# Patient Record
Sex: FEMALE | URBAN - METROPOLITAN AREA
[De-identification: names, ages, dates, MRNs, and addresses within clinical notes are randomized per-mention and may not be internally consistent; named-entity substitution may affect disease eponyms.]

---

## 2024-06-21 ENCOUNTER — OFFICE VISIT (OUTPATIENT)
Dept: PHYSICAL MEDICINE AND REHAB | Facility: CLINIC | Age: 29
End: 2024-06-21

## 2024-06-21 DIAGNOSIS — M75.02 ADHESIVE CAPSULITIS OF LEFT SHOULDER: Primary | ICD-10-CM

## 2024-06-21 PROCEDURE — 99204 OFFICE O/P NEW MOD 45 MIN: CPT | Performed by: PHYSICAL MEDICINE & REHABILITATION

## 2024-06-21 RX ORDER — MELOXICAM 15 MG/1
15 TABLET ORAL DAILY
Qty: 14 TABLET | Refills: 0 | Status: SHIPPED | OUTPATIENT
Start: 2024-06-21

## 2024-06-21 NOTE — H&P
History and Physical    C/C:   Chief Complaint   Patient presents with    New Patient     New patient is here with complaints of left shoulder pain. States the pain started 6/7 months ago , no know injury occurring. Reports pain to be a constant aching pain. Not taking any pain meds or muscle relaxer's. No physical therapy. Pian depends on activity.     HPI: 28 year old left handed female presents with chronic left shoulder pain without inciting event.  Has been present for about 6 to 7 months without an inciting event.  She has no previous history of left shoulder disorders.  Overhead lifting mildly painful. Range is a little limited. Able to do all activity despite slight lack of ROM. Worsens thoughout the day, and can worsen at night time. Does some exercise at home, which does not overly provoke left shoulder pain.  Some pain with upper body dressing.    Allergies: NKDA    Patient-reported ROS  Constitutional  Sleep Disturbance: denies  Chills: denies  Fever: denies  Weight Gain: denies  Weight Loss: denies   Cardiovascular  Chest Pain: denies  Irregular Heartbeat: denies   Respiratory  Painful Breathing: denies  Wheezing: denies   Gastrointestinal  Bowel Incontinence: denies  Heartburn: denies  Abdominal Pain: denies  Blood in Stool : denies  Rectal Pain: denies   Hematology  Easy Bruising: denies  Easy Bleeding: denies   Genitourinary  Difficulty Urinating: denies  Bladder Incontinence: denies  Pelvic Pain: denies  Painful Urination: denies   Musculoskeletal  Joint Stiffness: denies  Painful Joints: denies  Tailbone Pain: denies  Swollen Joints: denies   Peripheral Vascular  Swelling of Legs/Feet: denies  Cold Extremities: denies   Skin  Open Sores: denies  Nodules or Lumps: denies  Rash: denies   Neurological  Loss of Strength Since last Visit: denies  Tingling/Numbness: denies  Balance: denies   Psychiatric  Anxiety: denies  Depressed Mood: denies      Physical exam:    PE left shoulder exam:    Range of  motion:  Forward flexion: 160 degrees  Abduction: 180 degrees  Internal rotation: T7  External rotation: minimal to no restriction with PROM    Provocative test:  Supraspinatus test: negative  Hawkin's test: negative  Neer's test: +  AC joint tenderness: negative  Cross arm adduction test: negative    Imaging: No imaging to review    Assessment and plan:  Adhesive capsulitis of left shoulder    Though she is not the typical patient to develop adhesive capsulitis she does appear to have developed it to a mild degree. This may get better within the next few months.  We discussed her treatment options, which includes a trial of meloxicam 15 mg daily for the next 2 weeks, physical therapy to improve the range of motion and for scapular stabilization, and if not making further improvement and if significantly painful left subacromial bursa or glenohumeral joint steroid injection under ultrasound guidance.  Her range of motion is limited in forward flexion, but not overly limited in any other planes; injection may not be needed. She will start with meloxicam + PT. If she has not seen any improvement whatsoever after 3 weeks of physical therapy, or if shoulder pain worsens consider an x-ray and MRI of the left shoulder.    Julio Mckeon DO  Physical Medicine and Rehabilitation  [facility]

## 2024-06-21 NOTE — PATIENT INSTRUCTIONS
If not seeing any improvement after 3 weeks of physical therapy please let me know and I will order Xray, and possibly an MRI of your left shoulder.

## 2024-07-09 ENCOUNTER — TELEPHONE (OUTPATIENT)
Dept: PHYSICAL THERAPY | Facility: HOSPITAL | Age: 29
End: 2024-07-09

## 2024-07-11 ENCOUNTER — OFFICE VISIT (OUTPATIENT)
Dept: PHYSICAL THERAPY | Age: 29
End: 2024-07-11
Attending: PHYSICAL MEDICINE & REHABILITATION
Payer: COMMERCIAL

## 2024-07-11 DIAGNOSIS — M75.02 ADHESIVE CAPSULITIS OF LEFT SHOULDER: Primary | ICD-10-CM

## 2024-07-11 PROCEDURE — 97161 PT EVAL LOW COMPLEX 20 MIN: CPT | Performed by: PHYSICAL THERAPIST

## 2024-07-11 PROCEDURE — 97112 NEUROMUSCULAR REEDUCATION: CPT | Performed by: PHYSICAL THERAPIST

## 2024-07-11 PROCEDURE — 97110 THERAPEUTIC EXERCISES: CPT | Performed by: PHYSICAL THERAPIST

## 2024-07-11 NOTE — PROGRESS NOTES
SHOULDER EVALUATION:     Diagnosis:   Adhesive capsulitis of left shoulder (M75.02)       Referring Provider: Julio Mckeon  Date of Evaluation:    7/11/2024    Precautions:  None Next MD visit:   none scheduled  Date of Surgery: n/a     PATIENT SUMMARY   Sarina Barnard is a 28 year old female who presents to therapy today with complaints of (L) anterior and lateral (L) shoulder pain for no apparent cause since about 8 months ago.  Pt describes pain level current 0/10, at best 0/10, at worst 6/10. Symptoms aggravated by reaching and lifting especially overhead and behind the back.  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     Sarina describes prior level of function being (I) and active; lives with  and shares homemaking chores and describes cooking using cast iron. Pt goals include pain relief.  She works FT as a  with light PDL.  Past medical history was reviewed with Sarina and determined non-contributory.    ASSESSMENT  Sarina presents to physical therapy evaluation with primary c/o (L) shoulder pain (dominant arm) especially with reaching and lifting overhead. The results of the objective tests and measures show symptoms and findings consistent with subacromial impingement resulting in increased guarding and capsular tightness and aggravated by postures promoting scapular protraction and anterior tilt.  Functional deficits include but are not limited to painful reaching overhead and behind the back.  Signs and symptoms are consistent with diagnosis as stated above. Pt and PT discussed evaluation findings, pathology, POC and HEP.  Pt voiced understanding and performs HEP correctly without reported pain. Skilled Physical Therapy is medically necessary to address the above impairments and reach functional goals.     OBJECTIVE:   Observation/Posture: forward head and rounded shoulders; dowager's  hump noted  Palpation: tenderness to anterior (L) shoulder and bicipital groove  Sensation: grossly intact  Cervical Screen: negative    AROM: (* denotes performed with pain)  Shoulder    Flexion: R 180; L 160  Abduction: R 180; L 160  ER: R 110; L 90  IR: R 70; L 60     Accessory motion: mod loss of (L) GH posterior and inferior glide; mod loss of (L) scapulothoracic mobility towards posterior tilt, adduction and upward rotation.    Flexibility: mod loss to (L) pectorals    Strength/MMT: (* denotes performed with pain)  Shoulder Scapular   Flexion: R 5/5; L 4/5  Abduction: R 5/5; L 4/5  ER: R 5/5; L 4/5  IR: R 5/5; L 4/5 Rhomboids: R4-/5, L 4-/5  Mid trap: R 4-/5; L 4-/5   Lats: R 4-/5, L 4-/5  Low trap: R 4-/5; L 4-/5     Special tests:   (+) cross-over impingement, Neer's, Hawkin's-Gerardo    Today’s Treatment and Response:   Pt education was provided on exam findings, treatment diagnosis, treatment plan, expectations, and prognosis. Pt was also provided recommendations for activity modifications, possible soreness after evaluation, modalities as needed [ice/heat], postural corrections, ergonomics, and pain science education .    NEUROMUSCULAR RE-EDUCATION (10 mins)  Posture education and influence of postural influences on symptom irritability.  Wall posture drill  Work ergonomics with use of laptop    THERAPEUTIC EXERCISE (15 mins)  Cervical retractions  Scapular retractions  Scifit UE only x 3 mins ea fwd/retro focusing on upright posture  Doorway pec stretch A and 90/90 3 x 1 min ea  Patient was instructed in and issued a HEP for: Refer to patient instructions.    Charges: PT Eval Low Complexity, 20 mins      Total Timed Treatment: 25 min     Total Treatment Time: 45 min     Based on clinical rationale and outcome measures, this evaluation involved Low Complexity decision making due to 1-2 personal factors/comorbidities, 4+ body structures involved/activity limitations, and evolving symptoms including  changing pain levels.  PLAN OF CARE:    Goals: (to be met in 8 visits)   Sarina will demonstrate improved posture to promote proper scapular positioning and reduce tissue irritability.  Sarina will restore WNL and painfree (L) shoulder AROM to allow painfree reaching including overhead.  Sarina will have improved scapular and RC strength to 4+ to 5/5 to allow painfree lifting including overhead.  Sarina will be (I) with a home program to allow discharge from PT towards further self-recovery and maintenance of function.    Frequency / Duration: Patient will be seen for 1-2 x/week or a total of 8 visits over a 90 day period. Treatment will include: Manual Therapy, Neuromuscular Re-education, Self-Care Home Management, Therapeutic Activities, Therapeutic Exercise, and Home Exercise Program instruction    Education or treatment limitation: None  Rehab Potential:good    QuickDASH Outcome Score  Score: 11.36 % (7/8/2024  6:28 PM)      Sarina was advised of these findings, precautions, and treatment options and has agreed to actively participate in planning and for this course of care.    Thank you for your referral. Please co-sign or sign and return this letter via fax as soon as possible to [phone]. If you have any questions, please contact me at Dept: [phone]    Sincerely,  Electronically signed by therapist: Christiano Hightower, PT  Physician's certification required: Yes  I certify the need for these services furnished under this plan of treatment and while under my care.    X___________________________________________________ Date____________________    Certification From: 7/11/2024  To:10/9/2024

## 2024-07-15 ENCOUNTER — OFFICE VISIT (OUTPATIENT)
Dept: PHYSICAL THERAPY | Age: 29
End: 2024-07-15
Attending: PHYSICAL MEDICINE & REHABILITATION
Payer: COMMERCIAL

## 2024-07-15 PROCEDURE — 97112 NEUROMUSCULAR REEDUCATION: CPT | Performed by: PHYSICAL THERAPIST

## 2024-07-15 PROCEDURE — 97110 THERAPEUTIC EXERCISES: CPT | Performed by: PHYSICAL THERAPIST

## 2024-07-15 PROCEDURE — 97140 MANUAL THERAPY 1/> REGIONS: CPT | Performed by: PHYSICAL THERAPIST

## 2024-07-15 NOTE — PROGRESS NOTES
Dx: Adhesive capsulitis of left shoulder (M75.02)           Insurance (Authorized # of Visits):  8           Authorizing Physician: Dr. Linda Solitario MD visit: none scheduled  Fall Risk: standard         Precautions: n/a           Medication changes per patient? NO  Date of evaluation/PN:2024  Pain ratin at rest  Subjective: No problems reported with initial HEP and noted with post exercise soreness at the end of today's session.    Objective: Decreased tightness of pectoral mm noted and now able to achieve full (L) shoulder PROM; difficulty maintaining proximal stability in [place] with min perturbations.      Assessment: Decreased tightness of anterior structures allowing improved ROM but needs work on proximal strength/stability.  Treatment today include Manual Therapy and Therapeutic Exercise focusing on joint mobility and proximal stability and noted improvement in reduction of tightness in anterior structures post session. Residual deficits continue to be noted in decreased (L)UE proximal strength/stability and will be addressed with continued skilled interventions.      Goals: In progress as follows:  Sarnia will demonstrate improved posture to promote proper scapular positioning and reduce tissue irritability.  Sarina will restore WNL and painfree (L) shoulder AROM to allow painfree reaching including overhead.  Sarina will have improved scapular and RC strength to 4+ to 5/5 to allow painfree lifting including overhead.  Sarina will be (I) with a home program to allow discharge from PT towards further self-recovery and maintenance of function.    Plan: Monitor duration of post ex soreness and update HEP as appropriate next session.   Date: 7/15/2024  TX#:  Date:                 TX#: 3/ Date:                 TX#: 4/ Date:                 TX#: 5/ Date:   Tx#: 6/ Date:   Tx#: 7/ Date:   Tx#: 8/ Date:   Tx#: 9/ Date:   Tx#: 10/ Date:   Tx#: 11/ Date:   Tx#: 12/ Date:   Tx#: 13/ Date:   Tx#:  14/ Date:   Tx#: 15/ Date:   Tx#: 16/   THERAPEUTIC EX 30 mins                 Scifit UE only  L1 x 4 mins ea fwd/retro focus on upright posture                 (L) shoulder PROM 15 mins                 Lat pull downs Red cord 2x10                 Low rows with ER Red cord 2x10                                                                                         NEUROMUSCULAR RE-EDUCATION 8 mins                 Shoulder alphabet Standing at 90 flexion/scaption fist against yoga ball upper and lower case                 Rhythmic stabilization Supine at 90 and 110 flexion 2 x 30 secs ea                                   MANUAL TX 8 mins                 Joint mobilization Thoracic PA's gr 3-4; (L) scapulothoracic all planes; (L) GH post/inf gr 3-4 x 5 mins                 STM  (L) pectorals x 3 mins                                   THERAPEUTIC ACTIVITY                                    HEP: Continue initial HEP    Charges: Therapeutic exercise x 2; Neuromuscular re-education x 1; Manual therapy x 1       Total Timed Treatment: 46 min  Total Treatment Time: 46 min

## 2024-07-17 ENCOUNTER — OFFICE VISIT (OUTPATIENT)
Dept: PHYSICAL THERAPY | Age: 29
End: 2024-07-17
Attending: PHYSICAL MEDICINE & REHABILITATION
Payer: COMMERCIAL

## 2024-07-17 PROCEDURE — 97112 NEUROMUSCULAR REEDUCATION: CPT | Performed by: PHYSICAL THERAPIST

## 2024-07-17 PROCEDURE — 97110 THERAPEUTIC EXERCISES: CPT | Performed by: PHYSICAL THERAPIST

## 2024-07-17 NOTE — PROGRESS NOTES
Dx: Adhesive capsulitis of left shoulder (M75.02)           Insurance (Authorized # of Visits):  8           Authorizing Physician: Dr. Linda Solitario MD visit: none scheduled  Fall Risk: standard         Precautions: n/a           Medication changes per patient? NO  Date of evaluation/PN:2024  Pain ratin/10  Subjective: Reports feeling sore for the rest of the day after her previous session. Arrived with 2/10 rest pain.    Objective: Max cueing required to avoid genu recurvatum and maintain core engagement with performance of UE tasks. Held off on HEP progression with latent increased tissue irritability post-session.      Assessment: Needs work on core and proximal strengthening with proximal instability likely resulting in increased tissue irritability with increased available AROM.  Treatment today include Neuromuscular re-education and Therapeutic exercise focusing on increased core strength and proximal stability but noted with residual deficits in decreased (L)UE proximal strength/stability and will be addressed with continued skilled interventions.      Goals: In progress as follows:  Sarina will demonstrate improved posture to promote proper scapular positioning and reduce tissue irritability.  Sarina will restore WNL and painfree (L) shoulder AROM to allow painfree reaching including overhead.  Sarina will have improved scapular and RC strength to 4+ to 5/5 to allow painfree lifting including overhead.  Sarina will be (I) with a home program to allow discharge from PT towards further self-recovery and maintenance of function.    Plan: Continue to monitor post session soreness and adjust ex's as tolerated.   Date: 7/15/2024  TX#: 2 Date: 2024              TX#: 3/8 Date:                 TX#: 4/ Date:                 TX#: 5/ Date:   Tx#: 6/ Date:   Tx#: 7/ Date:   Tx#: 8/ Date:   Tx#: / Date:   Tx#: 10/ Date:   Tx#: 11/ Date:   Tx#: 12/ Date:   Tx#: 13/ Date:   Tx#: 14/ Date:    Tx#: 15/ Date:   Tx#: 16/   THERAPEUTIC EX 30 mins 38 mins                Scifit UE only  L1 x 4 mins ea fwd/retro focus on upright posture L1 x 4 mins ea fwd/retro focusing on upright posture                (L) shoulder PROM 15 mins                 Lat pull downs Red cord 2x10 Yellow cord 2x10                Low rows with ER Red cord 2x10 Yellow cord 2x10                SB push up plus  RSB 2x10                Face pulls  Yellow cord 2x10                High rows  Yellow cord 2x10                (L) shoulder D1/D2  Supine 2x15 ea                SL shoulder ER  (L) 2lbs 3x10                                  NEUROMUSCULAR RE-EDUCATION 8 mins 8 mins                Shoulder alphabet Standing at 90 flexion/scaption fist against yoga ball upper and lower case Standing at 90 flexion/scaption fist against yoga ball upper and lower case                Rhythmic stabilization Supine at 90 and 110 flexion 2 x 30 secs ea Supine at 90 and 110 flexion 2 x 60 secs ea                                  MANUAL TX 8 mins                 Joint mobilization Thoracic PA's gr 3-4; (L) scapulothoracic all planes; (L) GH post/inf gr 3-4 x 5 mins                 STM  (L) pectorals x 3 mins                                   THERAPEUTIC ACTIVITY                                    HEP: Continue initial HEP and monitor for continued soreness.    Charges: Therapeutic exercise x 3; Neuromuscular re-education x 1      Total Timed Treatment: 46 min  Total Treatment Time: 46 min

## 2024-07-23 ENCOUNTER — OFFICE VISIT (OUTPATIENT)
Dept: PHYSICAL THERAPY | Age: 29
End: 2024-07-23
Attending: PHYSICAL MEDICINE & REHABILITATION
Payer: COMMERCIAL

## 2024-07-23 PROCEDURE — 97110 THERAPEUTIC EXERCISES: CPT | Performed by: PHYSICAL THERAPIST

## 2024-07-23 PROCEDURE — 97112 NEUROMUSCULAR REEDUCATION: CPT | Performed by: PHYSICAL THERAPIST

## 2024-07-23 NOTE — PROGRESS NOTES
Dx: Adhesive capsulitis of left shoulder (M75.02)           Insurance (Authorized # of Visits):  8           Authorizing Physician: Dr. Linda Solitario MD visit: none scheduled  Fall Risk: standard         Precautions: n/a           Medication changes per patient? NO  Date of evaluation/PN:2024  Pain ratin/10  Subjective: Reports symptoms recurred yesterday and has remained worsened for no apparent cause.    Objective: Noted with increased trunk and LE movement from resisted UE movement on scifit; poor TA recruitment in unloaded neutral spine position      Assessment: Poor core/proximal stability promoting recurrence of increased tissue irritability with normal function.  Treatment today include Neuromuscular re-education and Therapeutic exercise focusing on increased core strength and proximal stability but noted with residual deficits in decreased core strength/stability and (L)UE proximal strength/stability and will be addressed with continued skilled interventions.      Goals: In progress as follows:  Sarina will demonstrate improved posture to promote proper scapular positioning and reduce tissue irritability.  Sarina will restore WNL and painfree (L) shoulder AROM to allow painfree reaching including overhead.  Sarina will have improved scapular and RC strength to 4+ to 5/5 to allow painfree lifting including overhead.  Sarina will be (I) with a home program to allow discharge from PT towards further self-recovery and maintenance of function.    Plan: Check response to updated HEP.   Date: 7/15/2024  TX#: 2 Date: 2024              TX#: 3/8 Date: 2024              TX#:  Date:                 TX#: 5/ Date:   Tx#: 6/ Date:   Tx#: 7/ Date:   Tx#: 8/ Date:   Tx#: / Date:   Tx#: 10/ Date:   Tx#: 11/ Date:   Tx#: 12/ Date:   Tx#: 13/ Date:   Tx#: 14/ Date:   Tx#: 15/ Date:   Tx#: 16/   THERAPEUTIC EX 30 mins 38 mins 40 mins               Scifit UE only  L1 x 4 mins ea fwd/retro  focus on upright posture L1 x 4 mins ea fwd/retro focusing on upright posture L3 x 4 mins ea fwd/retro focusing on upright posture               (L) shoulder PROM 15 mins                 Lat pull downs Red cord 2x10 Yellow cord 2x10                Low rows with ER Red cord 2x10 Yellow cord 2x10 Red cord 3x10               SB push up plus  RSB 2x10                Face pulls  Yellow cord 2x10 Red cord 3x10               High rows  Yellow cord 2x10                (L) shoulder D1/D2  Supine 2x15 ea                SL shoulder ER  (L) 2lbs 3x10 (L) 2lbs 3x10               (B) shoulder flexion   1lb dowel 3x10 supine               (B) shoulder ER in 90 abd   1lb dowel 3x10 supine               HL TA ball press   3x10               Prone heel squeeze   Yoga ball with knee flexion 3x10               Standing multifidi torsion   Standing on airex x 1 min                                 NEUROMUSCULAR RE-EDUCATION 8 mins 8 mins 8 mins               Shoulder alphabet Standing at 90 flexion/scaption fist against yoga ball upper and lower case Standing at 90 flexion/scaption fist against yoga ball upper and lower case                Rhythmic stabilization Supine at 90 and 110 flexion 2 x 30 secs ea Supine at 90 and 110 flexion 2 x 60 secs ea Standing on airex with fist on yoga ball on wall and 90 flexion and scaption 2 x 1 min ea               HL multifidi isometrics   Leaky tire cheat with self-biofeedback x 4 mins                                 MANUAL TX 8 mins                 Joint mobilization Thoracic PA's gr 3-4; (L) scapulothoracic all planes; (L) GH post/inf gr 3-4 x 5 mins                 STM  (L) pectorals x 3 mins                                   THERAPEUTIC ACTIVITY                                    HEP: Refer to patient instructions.    Charges: Therapeutic exercise x 3; Neuromuscular re-education x 1      Total Timed Treatment: 48 min  Total Treatment Time: 48 min

## 2024-07-24 ENCOUNTER — OFFICE VISIT (OUTPATIENT)
Dept: PHYSICAL THERAPY | Age: 29
End: 2024-07-24
Attending: PHYSICAL MEDICINE & REHABILITATION
Payer: COMMERCIAL

## 2024-07-24 PROCEDURE — 97112 NEUROMUSCULAR REEDUCATION: CPT | Performed by: PHYSICAL THERAPIST

## 2024-07-24 PROCEDURE — 97110 THERAPEUTIC EXERCISES: CPT | Performed by: PHYSICAL THERAPIST

## 2024-07-25 ENCOUNTER — APPOINTMENT (OUTPATIENT)
Dept: PHYSICAL THERAPY | Age: 29
End: 2024-07-25
Attending: PHYSICAL MEDICINE & REHABILITATION
Payer: COMMERCIAL

## 2024-07-29 ENCOUNTER — OFFICE VISIT (OUTPATIENT)
Dept: PHYSICAL THERAPY | Age: 29
End: 2024-07-29
Attending: PHYSICAL MEDICINE & REHABILITATION
Payer: COMMERCIAL

## 2024-07-29 PROCEDURE — 97112 NEUROMUSCULAR REEDUCATION: CPT | Performed by: PHYSICAL THERAPIST

## 2024-07-29 PROCEDURE — 97110 THERAPEUTIC EXERCISES: CPT | Performed by: PHYSICAL THERAPIST

## 2024-07-29 NOTE — PROGRESS NOTES
Dx: Adhesive capsulitis of left shoulder (M75.02)           Insurance (Authorized # of Visits):  8           Authorizing Physician: Dr. Linda Solitario MD visit: none scheduled  Fall Risk: standard         Precautions: n/a           Medication changes per patient? NO  Date of evaluation/PN:2024  Pain ratin/10  Subjective: Arrived with minimal pain and only reports m soreness post session.    Objective: Improving consistency with maintaining core engagement with (L) shoulder rhythmic stabilization ex's.      Assessment: Improving core engagement allowing reduced symptoms with upper quadrant movements and decreased tissue irritability in (L) shoulder.  Treatment today include Neuromuscular re-education and Therapeutic exercise focusing on increased core strength and proximal stability but noted with residual deficits in decreased core strength/stability and (L)UE proximal strength/stability and will be addressed with continued skilled interventions.      Goals: In progress as follows:  Sarina will demonstrate improved posture to promote proper scapular positioning and reduce tissue irritability.  Sarina will restore WNL and painfree (L) shoulder AROM to allow painfree reaching including overhead.  Sarina will have improved scapular and RC strength to 4+ to 5/5 to allow painfree lifting including overhead.  Sarina will be (I) with a home program to allow discharge from PT towards further self-recovery and maintenance of function.    Plan: continue to progress (L) scapular strengthening while focusing on core stability.   Date: 7/15/2024  TX#: 2 Date: 2024              TX#: 3/8 Date: 2024              TX#:  Date: 2024             TX#:  Date: 2024  Tx#: 6 Date:   Tx#: 7/ Date:   Tx#: 8/ Date:   Tx#: 9/ Date:   Tx#: 10/ Date:   Tx#: 11/ Date:   Tx#: 12/ Date:   Tx#: 13/ Date:   Tx#: 14/ Date:   Tx#: 15/ Date:   Tx#: 16/   THERAPEUTIC EX 30 mins 38 mins 40 mins 40 mins 40  mins             Scifit UE only  L1 x 4 mins ea fwd/retro focus on upright posture L1 x 4 mins ea fwd/retro focusing on upright posture L3 x 4 mins ea fwd/retro focusing on upright posture  L3 x 4 mins ea fwd/retro focusing on upright posture             (L) shoulder PROM 15 mins                 Lat pull downs Red cord 2x10 Yellow cord 2x10   Multi pull standing on airex beam 15lbs 3x10             Low rows with ER Red cord 2x10 Yellow cord 2x10 Red cord 3x10  Red cord 3x10             SB push up plus  RSB 2x10                Face pulls  Yellow cord 2x10 Red cord 3x10  Red cord 3x10             High rows  Yellow cord 2x10   Red cord 3x10             (L) shoulder D1/D2  Supine 2x15 ea                SL shoulder ER  (L) 2lbs 3x10 (L) 2lbs 3x10               (B) shoulder flexion   1lb dowel 3x10 supine               (B) shoulder ER in 90 abd   1lb dowel 3x10 supine               HL TA ball press   3x10               Prone heel squeeze   Yoga ball with knee flexion 3x10               Standing multifidi torsion   Standing on airex x 1 min               Prone V, W, horizontal abd    2 x 10 ea              HL isometric hip add with LE lifts    Yoga ball 3x10; last set with pilates 100s              HL isometric hip abd with LE lifts    Ring with yoga ball b/w ankles 3x10; last set with pilates 100s              HL bent knee fall outs    Green tband 3x10 ea              SL clamshells    Green tband 3x10 ea              HL TA ring press    Yoga ball b/w knees 3x10              TM retrowalk    12% grade 0.5 to 1mph x 10 mins with mirror biofeedback              90/90 wall taps     1lb ball 2 x 30 secs ea             Wall clock     4 to 2 o'clock x 10 rds                               NEUROMUSCULAR RE-EDUCATION 8 mins 8 mins 8 mins 8 mins 8 mins             Shoulder alphabet Standing at 90 flexion/scaption fist against yoga ball upper and lower case Standing at 90 flexion/scaption fist against yoga ball upper and lower case    Standing on airex at 90 flexion and scaption fist on yoga ball upper/lower case             Rhythmic stabilization Supine at 90 and 110 flexion 2 x 30 secs ea Supine at 90 and 110 flexion 2 x 60 secs ea Standing on airex with fist on yoga ball on wall and 90 flexion and scaption 2 x 1 min ea Standing on airex with fist on yoga ball on wall and 90 flexion and scaption 2 x 1 min ea Standing on airex with fist on yoga ball on wall and 90 flexion and scaption 2 x 1 min ea             HL multifidi isometrics   Leaky tire cheat with self-biofeedback x 4 mins               Standing with forehead on ball on wall    V, W, horizontal abd 2x10 with 3 sec hold ea                                MANUAL TX 8 mins                 Joint mobilization Thoracic PA's gr 3-4; (L) scapulothoracic all planes; (L) GH post/inf gr 3-4 x 5 mins                 STM  (L) pectorals x 3 mins                                   THERAPEUTIC ACTIVITY                                    HEP: Continue current HEP.    Charges: Therapeutic exercise x 3; Neuromuscular re-education x 1      Total Timed Treatment: 48 min  Total Treatment Time: 48 min

## 2024-07-31 ENCOUNTER — APPOINTMENT (OUTPATIENT)
Dept: PHYSICAL THERAPY | Age: 29
End: 2024-07-31
Attending: PHYSICAL MEDICINE & REHABILITATION
Payer: COMMERCIAL

## 2024-08-01 ENCOUNTER — OFFICE VISIT (OUTPATIENT)
Dept: PHYSICAL THERAPY | Age: 29
End: 2024-08-01
Attending: PHYSICAL MEDICINE & REHABILITATION
Payer: COMMERCIAL

## 2024-08-01 PROCEDURE — 97110 THERAPEUTIC EXERCISES: CPT | Performed by: PHYSICAL THERAPIST

## 2024-08-01 PROCEDURE — 97112 NEUROMUSCULAR REEDUCATION: CPT | Performed by: PHYSICAL THERAPIST

## 2024-08-01 NOTE — PROGRESS NOTES
Dx: Adhesive capsulitis of left shoulder (M75.02)           Insurance (Authorized # of Visits):  8           Authorizing Physician: Dr. Linda Solitario MD visit: none scheduled  Fall Risk: standard         Precautions: n/a           Medication changes per patient? NO  Date of evaluation/PN:2024  Pain ratin/10  Subjective: Reports her shoulder is feeling better.    Objective: Demonstrating consistent core engagement with performance of upper quadrant stability ex's      Assessment: Improving core strength allowing decreased symptoms and tolerance to progression of OH loads.  Treatment today include Neuromuscular re-education and Therapeutic exercise focusing on increased core strength and proximal stability but noted with residual deficits in decreased core strength/stability and (L)UE proximal strength/stability and will be addressed with continued skilled interventions.      Goals: In progress as follows:  Sarina will demonstrate improved posture to promote proper scapular positioning and reduce tissue irritability.  Sarina will restore WNL and painfree (L) shoulder AROM to allow painfree reaching including overhead.  Sarina will have improved scapular and RC strength to 4+ to 5/5 to allow painfree lifting including overhead.  Sarina will be (I) with a home program to allow discharge from PT towards further self-recovery and maintenance of function.    Plan: continue to progress (L) scapular strengthening while focusing on core stability.   Date: 7/15/2024  TX#:  Date: 2024              TX#: 3/8 Date: 2024              TX#:  Date: 2024             TX#:  Date: 2024  Tx#:  Date:2024  Tx#:  Date:   Tx#: / Date:   Tx#: 9/ Date:   Tx#: 10/ Date:   Tx#: 11/ Date:   Tx#: 12/ Date:   Tx#: 13/ Date:   Tx#: 14/ Date:   Tx#: 15/ Date:   Tx#: 16/   THERAPEUTIC EX 30 mins 38 mins 40 mins 40 mins 40 mins 40 mins            Scifit UE only  L1 x 4 mins ea fwd/retro focus  on upright posture L1 x 4 mins ea fwd/retro focusing on upright posture L3 x 4 mins ea fwd/retro focusing on upright posture  L3 x 4 mins ea fwd/retro focusing on upright posture L3 x 4 mins ea fwd/retro focusing on upright posture            (L) shoulder PROM 15 mins                 Lat pull downs Red cord 2x10 Yellow cord 2x10   Multi pull standing on airex beam 15lbs 3x10             Low rows with ER Red cord 2x10 Yellow cord 2x10 Red cord 3x10  Red cord 3x10 Green cord 3x10            SB push up plus  RSB 2x10                Face pulls  Yellow cord 2x10 Red cord 3x10  Red cord 3x10 Green cord 3x10            High rows  Yellow cord 2x10   Red cord 3x10 Green cord 3x10            (L) shoulder D1/D2  Supine 2x15 ea                SL shoulder ER  (L) 2lbs 3x10 (L) 2lbs 3x10               (B) shoulder flexion   1lb dowel 3x10 supine               (B) shoulder ER in 90 abd   1lb dowel 3x10 supine               HL TA ball press   3x10   RSB with LE lifts            Prone heel squeeze   Yoga ball with knee flexion 3x10               Standing multifidi torsion   Standing on airex x 1 min               Prone V, W, horizontal abd    2 x 10 ea              HL isometric hip add with LE lifts    Yoga ball 3x10; last set with pilates 100s              HL isometric hip abd with LE lifts    Ring with yoga ball b/w ankles 3x10; last set with pilates 100s              HL bent knee fall outs    Green tband 3x10 ea              SL clamshells    Green tband 3x10 ea              HL TA ring press    Yoga ball b/w knees 3x10  Alt/opp UE/LE ring press 3x10 ea            TM retrowalk    12% grade 0.5 to 1mph x 10 mins with mirror biofeedback              90/90 wall taps     1lb ball 2 x 30 secs ea             Wall clock     4 to 2 o'clock x 10 rds             Bird dogs      Over RSB 3x10            Quadruped V, W, horizontal abd      Over RSB 3x10 ea            Side planks      With clamshells 3x10 ea                               NEUROMUSCULAR RE-EDUCATION 8 mins 8 mins 8 mins 8 mins 8 mins 8 mins            Shoulder alphabet Standing at 90 flexion/scaption fist against yoga ball upper and lower case Standing at 90 flexion/scaption fist against yoga ball upper and lower case   Standing on airex at 90 flexion and scaption fist on yoga ball upper/lower case             Rhythmic stabilization Supine at 90 and 110 flexion 2 x 30 secs ea Supine at 90 and 110 flexion 2 x 60 secs ea Standing on airex with fist on yoga ball on wall and 90 flexion and scaption 2 x 1 min ea Standing on airex with fist on yoga ball on wall and 90 flexion and scaption 2 x 1 min ea Standing on airex with fist on yoga ball on wall and 90 flexion and scaption 2 x 1 min ea             HL multifidi isometrics   Leaky tire cheat with self-biofeedback x 4 mins               Standing with forehead on ball on wall    V, W, horizontal abd 2x10 with 3 sec hold ea  2lbs ea UE V, W, horizontal abd 3 x 10 ea                              MANUAL TX 8 mins                 Joint mobilization Thoracic PA's gr 3-4; (L) scapulothoracic all planes; (L) GH post/inf gr 3-4 x 5 mins                 STM  (L) pectorals x 3 mins                                   THERAPEUTIC ACTIVITY                                    HEP: Continue current HEP.    Charges: Therapeutic exercise x 3; Neuromuscular re-education x 1      Total Timed Treatment: 48 min  Total Treatment Time: 48 min

## 2024-08-05 ENCOUNTER — OFFICE VISIT (OUTPATIENT)
Dept: PHYSICAL THERAPY | Age: 29
End: 2024-08-05
Attending: PHYSICAL MEDICINE & REHABILITATION
Payer: COMMERCIAL

## 2024-08-05 PROCEDURE — 97110 THERAPEUTIC EXERCISES: CPT | Performed by: PHYSICAL THERAPIST

## 2024-08-05 PROCEDURE — 97112 NEUROMUSCULAR REEDUCATION: CPT | Performed by: PHYSICAL THERAPIST

## 2024-08-05 NOTE — PROGRESS NOTES
Discharge Summary  Pt has attended 8 visits in Physical Therapy.     Dx: Adhesive capsulitis of left shoulder (M75.02)           Insurance (Authorized # of Visits):  8           Authorizing Physician: Dr. Linda Solitario MD visit: none scheduled  Fall Risk: standard         Precautions: n/a           Medication changes per patient? NO  Date of evaluation/PN:2024  Pain ratin/10  Assessment: Sarina has now completed her course of physical therapy achieving WNL and painfree shoulder AROM with improved core strength and proximal stability in her upper quadrant allowing return to normal functional use of her Ue's. She is (I) with a home program to allow her to maintain her gains in PT    Subjective: No c/o reported this session.    Objective: demonstrating (I) with exercise performance and WNL/painfree shoulder AROM; strength grossly 4+ to 5/5.      Goals: MET as follows:  Sarina will demonstrate improved posture to promote proper scapular positioning and reduce tissue irritability.  Sarina will restore WNL and painfree (L) shoulder AROM to allow painfree reaching including overhead.  Sarina will have improved scapular and RC strength to 4+ to 5/5 to allow painfree lifting including overhead.  Sarina will be (I) with a home program to allow discharge from PT towards further self-recovery and maintenance of function.    Treatment:   Date: 2024              TX#: 3/8 Date: 2024              TX#:  Date: 2024             TX#:  Date: 2024  Tx#:  Date:2024  Tx#:  Date: 2024  Tx#:    THERAPEUTIC EX 38 mins 40 mins 40 mins 40 mins 40 mins 40 mins   Scifit UE only  L1 x 4 mins ea fwd/retro focusing on upright posture L3 x 4 mins ea fwd/retro focusing on upright posture  L3 x 4 mins ea fwd/retro focusing on upright posture L3 x 4 mins ea fwd/retro focusing on upright posture L3 x 4 mins ea fwd/retro focusing on upright posture   (L) shoulder PROM         Lat pull  downs Yellow cord 2x10   Multi pull standing on airex beam 15lbs 3x10  Red cord 3x10   Low rows with ER Yellow cord 2x10 Red cord 3x10  Red cord 3x10 Green cord 3x10 Red cord 3x10   SB push up plus RSB 2x10        Face pulls Yellow cord 2x10 Red cord 3x10  Red cord 3x10 Green cord 3x10 Red cord 3x10   High rows Yellow cord 2x10   Red cord 3x10 Green cord 3x10 Red cord 3x10   (L) shoulder D1/D2 Supine 2x15 ea        SL shoulder ER (L) 2lbs 3x10 (L) 2lbs 3x10       (B) shoulder flexion  1lb dowel 3x10 supine    Standing with back on foam roll 3lbs 3x10   (B) shoulder ER in 90 abd  1lb dowel 3x10 supine       HL TA ball press  3x10   RSB with LE lifts    Prone heel squeeze  Yoga ball with knee flexion 3x10       Standing multifidi torsion  Standing on airex x 1 min       Prone V, W, horizontal abd   2 x 10 ea      HL isometric hip add with LE lifts   Yoga ball 3x10; last set with pilates 100s      HL isometric hip abd with LE lifts   Ring with yoga ball b/w ankles 3x10; last set with pilates 100s      HL bent knee fall outs   Green tband 3x10 ea      SL clamshells   Green tband 3x10 ea      HL TA ring press   Yoga ball b/w knees 3x10  Alt/opp UE/LE ring press 3x10 ea    TM retrowalk   12% grade 0.5 to 1mph x 10 mins with mirror biofeedback      90/90 wall taps    1lb ball 2 x 30 secs ea     Wall clock    4 to 2 o'clock x 10 rds     Bird dogs     Over RSB 3x10    Quadruped V, W, horizontal abd     Over RSB 3x10 ea    Side planks     With clamshells 3x10 ea    (B) shoulder scaption      Standing with back on foam roll 3lbs 3x10   (B) shoulder curl to press      Standing with back on foam roll 3lbs 3x10            NEUROMUSCULAR RE-EDUCATION 8 mins 8 mins 8 mins 8 mins 8 mins 8 mins   Shoulder alphabet Standing at 90 flexion/scaption fist against yoga ball upper and lower case   Standing on airex at 90 flexion and scaption fist on yoga ball upper/lower case     Rhythmic stabilization Supine at 90 and 110 flexion 2 x 60  secs ea Standing on airex with fist on yoga ball on wall and 90 flexion and scaption 2 x 1 min ea Standing on airex with fist on yoga ball on wall and 90 flexion and scaption 2 x 1 min ea Standing on airex with fist on yoga ball on wall and 90 flexion and scaption 2 x 1 min ea     HL multifidi isometrics  Leaky tire cheat with self-biofeedback x 4 mins       Standing with forehead on ball on wall   V, W, horizontal abd 2x10 with 3 sec hold ea  2lbs ea UE V, W, horizontal abd 3 x 10 ea 2lbs ea UE V, W, horizontal abd 3 x 10 ea            MANUAL TX         Joint mobilization         STM                   THERAPEUTIC ACTIVITY                  HEP: Continue current HEP.    Charges: Therapeutic exercise x 3; Neuromuscular re-education x 1      Total Timed Treatment: 48 min  Total Treatment Time: 48 min  Post QuickDASH Outcome Score  Post Score: 9.09 % (8/5/2024  8:01 AM)    2.27 % improvement    Plan: Discharge with (I) [facility]ad[facility] was advised of these findings, precautions, and treatment options and has agreed to actively participate in planning and for this course of care.    Thank you for your referral. If you have any questions, please contact me at Dept: [phone].    Sincerely,  Electronically signed by therapist: Christiano Hightower PT

## 2024-08-08 ENCOUNTER — APPOINTMENT (OUTPATIENT)
Dept: PHYSICAL THERAPY | Age: 29
End: 2024-08-08
Attending: PHYSICAL MEDICINE & REHABILITATION
Payer: COMMERCIAL

## 2024-08-13 ENCOUNTER — APPOINTMENT (OUTPATIENT)
Dept: PHYSICAL THERAPY | Age: 29
End: 2024-08-13
Attending: PHYSICAL MEDICINE & REHABILITATION
Payer: COMMERCIAL

## 2024-08-14 ENCOUNTER — APPOINTMENT (OUTPATIENT)
Dept: PHYSICAL THERAPY | Age: 29
End: 2024-08-14
Attending: PHYSICAL MEDICINE & REHABILITATION
Payer: COMMERCIAL

## 2024-08-15 ENCOUNTER — APPOINTMENT (OUTPATIENT)
Dept: PHYSICAL THERAPY | Age: 29
End: 2024-08-15
Attending: PHYSICAL MEDICINE & REHABILITATION
Payer: COMMERCIAL

## 2024-08-19 ENCOUNTER — APPOINTMENT (OUTPATIENT)
Dept: PHYSICAL THERAPY | Age: 29
End: 2024-08-19
Attending: PHYSICAL MEDICINE & REHABILITATION
Payer: COMMERCIAL

## 2024-08-27 ENCOUNTER — APPOINTMENT (OUTPATIENT)
Dept: PHYSICAL THERAPY | Age: 29
End: 2024-08-27
Attending: PHYSICAL MEDICINE & REHABILITATION
Payer: COMMERCIAL

## 2024-09-03 ENCOUNTER — APPOINTMENT (OUTPATIENT)
Dept: PHYSICAL THERAPY | Age: 29
End: 2024-09-03
Attending: PHYSICAL MEDICINE & REHABILITATION
Payer: COMMERCIAL

## 2024-09-10 ENCOUNTER — APPOINTMENT (OUTPATIENT)
Dept: PHYSICAL THERAPY | Age: 29
End: 2024-09-10
Attending: PHYSICAL MEDICINE & REHABILITATION
Payer: COMMERCIAL

## 2024-10-03 ENCOUNTER — TELEMEDICINE (OUTPATIENT)
Dept: TELEHEALTH | Age: 29
End: 2024-10-03
Payer: COMMERCIAL

## 2024-10-03 DIAGNOSIS — Z02.89 ENCOUNTER TO OBTAIN EXCUSE FROM WORK: ICD-10-CM

## 2024-10-03 DIAGNOSIS — U07.1 POSITIVE SELF-ADMINISTERED ANTIGEN TEST FOR COVID-19: Primary | ICD-10-CM

## 2024-10-03 PROCEDURE — 99213 OFFICE O/P EST LOW 20 MIN: CPT | Performed by: NURSE PRACTITIONER

## 2024-10-03 NOTE — PROGRESS NOTES
Virtual/Telephone Check-In    Sarina Barnard verbally consents to a Virtual/Telephone Check-In service on 10/03/24.  Patient has been referred to the [facility] website at [email or web address] to review the yearly Consent to Treat document.  Patient understands and accepts financial responsibility for any deductible, co-insurance and/or co-pays associated with this service.       Telehealth Verbal Consent   I conducted a telehealth visit with Sarina Barnard today, 10/03/24, which was completed using two-way, real-time interactive audio and video communication. This has been done in good di to provide continuity of care in the best interest of the provider-patient relationship, due to the COVID -19 public health crisis/national emergency where restrictions of face-to-face office visits are ongoing. Every conscious effort was taken to allow for sufficient and adequate time to complete the visit.  The patient was made aware of the limitations of the telehealth visit, including treatment limitations as no physical exam could be performed.  The patient was advised to call 911 or to go to the ER in case there was an emergency.  The patient was also advised of the potential privacy & security concerns related to the telehealth platform.   The patient was made aware of where to find [facility]'s notice of privacy practices, telehealth consent form and other related consent forms and documents.  which are located on the [facility] website. The patient verbally agreed to telehealth consent form, related consents and the risks discussed.    Lastly, the patient confirmed that they were in [place].   Included in this visit, time may have been spent reviewing labs, medications, radiology tests and decision making. Appropriate medical decision-making and tests are ordered as detailed in the plan of care above.  Coding/billing information is submitted for this visit based on complexity of care and/or time spent for the visit.    CHIEF  COMPLAINT:     Chief Complaint   Patient presents with    Covid       HPI:   Sarina Barnard is a 28 year old female who presents for a video visit.  Patient reports COVID.  Requesting work note    Sx onset: 9/30/24  Treating sx with dayquil/nyquil  on 9/30 and 10/1  History of COVID: none  COVID vaccination: x 2 doses  Home COVID test: + on 10/1/24    Reports sx below at onset.  Symptoms improved; states symptoms have been mild.     Yes   No  []    [x] Fever  []    [x] Cough:             Dry []     Productive []   [x]    [] Congestion   [x]    [] Rhinorrhea     []    [x] Loss of Smell/Taste:    []    [x] Sore throat     []    [x] Ear Pain     [x]    [] Fatigue    [x]    [] Myalgias  [x]    [] Chills           [x]    [] Headache    []    [x] Shortness of breath/Trouble Breathing  []    [x] Wheezing  []    [x] Chest pain/pressure    []    [x] GI symptoms                 []  Nausea;   [] Vomiting;   [] Diarrhea;   [] Upset stomach;    []Abdominal Pain         Ht: 64 in    Wt 155 lb          No current outpatient medications on file.      No past medical history on file.   No past surgical history on file.      Social History     Socioeconomic History    Marital status: Unknown   Tobacco Use    Smoking status: Never     Passive exposure: Never    Smokeless tobacco: Never   Other Topics Concern    Caffeine Concern Yes    Exercise Yes         REVIEW OF SYSTEMS:   GENERAL: normal appetite  SKIN: no rashes or abnormal skin lesions  HEENT: See HPI  LUNGS:  See HPI  CARDIOVASCULAR: see HPI  GI: see HPI  NEURO: See HPI    EXAM:   General: Alert, Well-appearing, and In no acute distress  Respiratory:   Speaking in full sentences comfortably  Normal work of breathing  No cough during visit  Head: Normocephalic  Eyes: Conjunctiva clear  Nose: No obvious nasal discharge.  Mood: Affect appropriate    ASSESSMENT AND PLAN:   Sarina Barnard is a 28 year old female who presents with symptoms that are consistent  with    ASSESSMENT/PLAN:       Diagnoses and all orders for this visit:    Positive self-administered antigen test for COVID-19    Encounter to obtain excuse from work      Pt is well appearing.  Mild sx - have improved  Requesting work note; plans to RTW tomorrow.   Supportive measures as described in Patient Instructions.   Patient advised to follow up with PCP for worsening symptoms   To go to the ER for any severe symptoms such as chest pain or difficulty breathing.   Provided  CDC recommendations for self isolation          Patient Instructions   1. Drink plenty of fluids.    2. Tylenol or Ibuprofen over the counter can be used for pain/comfort.    3. You can take your preferred cough/cold medication over the counter if needed.  4.  Notify your primary care provider of new or worsening symptoms.  Seek immediate care for chest pain, wheezing, shortness of breath, or other   concerning symptoms.           Please follow CDC guidelines listed below:     When you may have a respiratory virus, the CDC recommends the following: ?  Stay home and away from others (including people you live with who are not sick) if you have respiratory virus symptoms.     You can go back to your normal activities when, for at least 24 hours, both of these are true:   Your symptoms are getting better overall, and   You have not had a fever (and are not using fever-reducing medication)    When you go back to your normal activities, take added precaution over the next 5 days, including wearing a mask, hygiene, physical distancing, taking additional steps for  air, and/or testing when you will be around other people indoors.  Keep in mind that you may still be able to spread the virus that made you sick, even if you are feeling better. You are likely to be less contagious at this time, depending on factors like how long you were sick or how sick you were.  If you develop a fever or you start to feel worse after you have gone back to  normal activities, contact your primary care provider.       Verbalized understanding of instructions        Face to face time spent on Video Visit: 6 min  Total Time spent on visit including reviewing history, ordering labs/medication, patient examination and education: 8 min

## 2024-10-03 NOTE — PATIENT INSTRUCTIONS
1. Drink plenty of fluids.    2. Tylenol or Ibuprofen over the counter can be used for pain/comfort.    3. You can take your preferred cough/cold medication over the counter if needed.  4.  Notify your primary care provider of new or worsening symptoms.  Seek immediate care for chest pain, wheezing, shortness of breath, or other   concerning symptoms.           Please follow CDC guidelines listed below:     When you may have a respiratory virus, the CDC recommends the following: ?  Stay home and away from others (including people you live with who are not sick) if you have respiratory virus symptoms.     You can go back to your normal activities when, for at least 24 hours, both of these are true:   Your symptoms are getting better overall, and   You have not had a fever (and are not using fever-reducing medication)    When you go back to your normal activities, take added precaution over the next 5 days, including wearing a mask, hygiene, physical distancing, taking additional steps for  air, and/or testing when you will be around other people indoors.  Keep in mind that you may still be able to spread the virus that made you sick, even if you are feeling better. You are likely to be less contagious at this time, depending on factors like how long you were sick or how sick you were.  If you develop a fever or you start to feel worse after you have gone back to normal activities, contact your primary care provider.

## 2025-02-17 ENCOUNTER — OFFICE VISIT (OUTPATIENT)
Dept: OBGYN CLINIC | Facility: CLINIC | Age: 30
End: 2025-02-17
Payer: COMMERCIAL

## 2025-02-17 VITALS
DIASTOLIC BLOOD PRESSURE: 81 MMHG | BODY MASS INDEX: 26.8 KG/M2 | WEIGHT: 157 LBS | HEART RATE: 82 BPM | SYSTOLIC BLOOD PRESSURE: 125 MMHG | HEIGHT: 64 IN

## 2025-02-17 DIAGNOSIS — N92.6 MISSED MENSES: Primary | ICD-10-CM

## 2025-02-17 DIAGNOSIS — Z32.00 PREGNANCY EXAMINATION OR TEST, PREGNANCY UNCONFIRMED: ICD-10-CM

## 2025-02-17 LAB
CONTROL LINE PRESENT WITH A CLEAR BACKGROUND (YES/NO): YES YES/NO
KIT LOT #: NORMAL NUMERIC
PREGNANCY TEST, URINE: POSITIVE

## 2025-02-17 NOTE — PROGRESS NOTES
CHIEF COMPLAINT:  Chief Complaint   Patient presents with    Gyn Exam     Missed mense; 8 week pregnant, no nausea, breast changes, tender and heavier            HPI:  Sarina is 29 year old, female, here today for a missed menses visit.  Currently, she is feeling well. Denies 1st trimester danger signs.     Patient's last menstrual period was 2024 (exact date).  Period Cycle (Days): monthly  Period Duration (Days): 5 days  Period Flow: light  Use of Birth Control (if yes, specify type): None  Hx Prior Abnormal Pap: No       Regular periods, every 27 days  LMP 24 --> 8.1 --> MADELYN 25    /FOB: Rm  Family H/O of genetic disorders: no  Cats at home: no  Occupational hazzards: no  H/O of STIs: no  H/O of chicken pox or vaccine: had chicken pox as a child  Exercise: planks and squats  History of MRSA or other difficult to treat infections: no  Recent Travel outside [place] no    HISTORY:  History reviewed. No pertinent past medical history.   History reviewed. No pertinent surgical history.   Family History   Problem Relation Age of Onset    Breast Cancer Maternal Aunt       Social History:   Social History     Socioeconomic History    Marital status:    Tobacco Use    Smoking status: Never     Passive exposure: Never    Smokeless tobacco: Never   Vaping Use    Vaping status: Never Used   Other Topics Concern    Caffeine Concern Yes    Exercise Yes          Medications (Active prior to today's visit):  No current outpatient medications on file.       Allergies:  Allergies[1]    REVIEW OF SYSTEMS:  Review of Systems   All other systems reviewed and are negative.       PHYSICAL EXAM:  Vitals:    25 1315   BP: 125/81   Pulse: 82     Physical Exam  Vitals and nursing note reviewed.   Constitutional:       General: She is not in acute distress.     Appearance: Normal appearance. She is not ill-appearing, toxic-appearing or diaphoretic.   Pulmonary:      Effort: Pulmonary effort is  normal.   Neurological:      Mental Status: She is alert and oriented to person, place, and time.   Psychiatric:         Mood and Affect: Mood normal.         Behavior: Behavior normal.         Thought Content: Thought content normal.         Judgment: Judgment normal.          Recent Results (from the past 24 hours)   POC Urine pregnancy test [99595]    Collection Time: 02/17/25  1:37 PM   Result Value Ref Range    Pregnancy Test, Urine Positive     Control Line Present with a clear background (yes/no) yes Yes/No    Kit Lot # [id number] Numeric    Kit Expiration Date 02/03/2026 Date        ASSESSMENT/PLAN:   Sarina was seen today for gyn exam.    Diagnoses and all orders for this visit:    Pregnancy examination or test, pregnancy unconfirmed  -     POC Urine pregnancy test [[id number]]    Missed menses  -     US PREG 1ST TRIMESTER (CPT=76801); Future         Today's UCG positive result reviewed with patient  Appropriate for CNM care   Taking prenatal vitamins      Pre-eclampsia Risk Assessment:   High Risk Factors (any 1 of below): no  Moderate Risk Factors (any 2 of below) nullip       Next visit: Patient to schedule Nurse Education visit, next available, and NOB for 12wga    Counseling included:  -- CNM care, philosophy, and protocols  -- Discussed importance of folic acid, calcium, vitamin D  -- Reviewed pregnancy recommendations regarding weight gain, diet/hydration, and food safety  -- Travel discussed, avoid travel to zika zones, use of support stockings with flights longer than 3 hours, movement every 2-3 hours if driving  -- Discussed exercise  -- Discussed avoidance of Jacuzzis, saunas, hot tubs and steam rooms  -- Discussed avoidance of alcohol, smoking, and minimizing caffeine  -- Warning signs reviewed. Advised to call office if occur. Safe use of Petta for messaging  -- Reviewed genetic/chromosomal testing options for pregnancies  --Desires ultrasound for viability. Order placed and instructed to  schedule  -- Schedule RN OB ED visit   -- I have spent 30 minutes total time on the day of the encounter, including: preparing to see the patient, ordering/reviewing labs, performing a medically appropriate exam, and providing care coordination. face to face counseling, chart review, orders and coordination of care    Pt verbalized understanding. All questions answered. No barriers to learning identified          [1]   Allergies  Allergen Reactions    Cefprozil HIVES    Penicillins HIVES

## 2025-02-18 ENCOUNTER — PATIENT MESSAGE (OUTPATIENT)
Dept: OBGYN CLINIC | Facility: CLINIC | Age: 30
End: 2025-02-18

## 2025-02-20 ENCOUNTER — NURSE ONLY (OUTPATIENT)
Dept: OBGYN CLINIC | Facility: CLINIC | Age: 30
End: 2025-02-20

## 2025-02-20 DIAGNOSIS — Z34.01 ENCOUNTER FOR SUPERVISION OF NORMAL FIRST PREGNANCY IN FIRST TRIMESTER (HCC): Primary | ICD-10-CM

## 2025-02-20 RX ORDER — CHOLECALCIFEROL (VITAMIN D3) 25 MCG
1 TABLET,CHEWABLE ORAL DAILY
COMMUNITY

## 2025-02-20 RX ORDER — BIOTIN 1 MG
TABLET ORAL
COMMUNITY

## 2025-02-20 NOTE — PROGRESS NOTES
Pt called today for RN OB Education.   Pt verified name and .    OB History    Para Term  AB Living   1 0 0 0 0 0   SAB IAB Ectopic Multiple Live Births   0 0 0 0 0     How did you learn of midwives: [facility])    Labor preferences: Would like an un-medicated delivery    Following a special diet:  No pork    LMP: 24    Pre  BMI: 26.08    EPDS score: 0/30    Working MADELYN: 24  Hx of genetic abnormality in family: 's sister has Down Syndrome  Hx of varicella: Pt reports hx of chicken pox in childhood     Consent (if needed): N/A    Sterilization/Contraception: Declines      OUD Screening: Pt. Has answered NO 5P questions and has NO  risk factors.    Pt. Given What pregnant women need to know handout.      SDOH Screening: Low risk    Educational material reviewed with patient: Prenatal care, nutrition, weight gain recommendations, travel, exercise, intercourse, pregnancy changes, safe medications, pregnancy and work, fetal movement, labor and  labor, warning signs, food safety, tdap, cord blood, breastfeeding, circumcision, and Group B strep.   Preferred feeding method - breastfeeding  Circ - yes      Blood transfusion if needed: Consents      PN labs: Orders placed.      Optional genetic screening labs were reviewed: Cell FreeDNA, FTS with US, Quad screen MSAFP and CF screening.   [facility] NIPT order completed. Pt to  Panorama kit and order form from office at their earliest convenience.       [facility] Media Policy: Discussed. Pt verbalized understanding and agreed.       NOB apt:  3/21 TM

## 2025-03-03 ENCOUNTER — LAB ENCOUNTER (OUTPATIENT)
Dept: LAB | Facility: HOSPITAL | Age: 30
End: 2025-03-03
Attending: ADVANCED PRACTICE MIDWIFE
Payer: COMMERCIAL

## 2025-03-03 DIAGNOSIS — Z34.01 ENCOUNTER FOR SUPERVISION OF NORMAL FIRST PREGNANCY IN FIRST TRIMESTER (HCC): ICD-10-CM

## 2025-03-03 LAB
ANTIBODY SCREEN: NEGATIVE
BASOPHILS # BLD AUTO: 0.03 X10(3) UL (ref 0–0.2)
BASOPHILS NFR BLD AUTO: 0.3 %
DEPRECATED RDW RBC AUTO: 47.8 FL (ref 35.1–46.3)
EOSINOPHIL # BLD AUTO: 0.07 X10(3) UL (ref 0–0.7)
EOSINOPHIL NFR BLD AUTO: 0.8 %
ERYTHROCYTE [DISTWIDTH] IN BLOOD BY AUTOMATED COUNT: 14.9 % (ref 11–15)
EST. AVERAGE GLUCOSE BLD GHB EST-MCNC: 111 MG/DL (ref 68–126)
HBA1C MFR BLD: 5.5 % (ref ?–5.7)
HBV SURFACE AG SER-ACNC: <0.1 [IU]/L
HBV SURFACE AG SERPL QL IA: NONREACTIVE
HCT VFR BLD AUTO: 38.5 %
HCV AB SERPL QL IA: NONREACTIVE
HGB BLD-MCNC: 13 G/DL
IMM GRANULOCYTES # BLD AUTO: 0.03 X10(3) UL (ref 0–1)
IMM GRANULOCYTES NFR BLD: 0.3 %
LYMPHOCYTES # BLD AUTO: 1.76 X10(3) UL (ref 1–4)
LYMPHOCYTES NFR BLD AUTO: 19.5 %
MCH RBC QN AUTO: 29.2 PG (ref 26–34)
MCHC RBC AUTO-ENTMCNC: 33.8 G/DL (ref 31–37)
MCV RBC AUTO: 86.5 FL
MONOCYTES # BLD AUTO: 0.48 X10(3) UL (ref 0.1–1)
MONOCYTES NFR BLD AUTO: 5.3 %
NEUTROPHILS # BLD AUTO: 6.67 X10 (3) UL (ref 1.5–7.7)
NEUTROPHILS # BLD AUTO: 6.67 X10(3) UL (ref 1.5–7.7)
NEUTROPHILS NFR BLD AUTO: 73.8 %
PLATELET # BLD AUTO: 250 10(3)UL (ref 150–450)
RBC # BLD AUTO: 4.45 X10(6)UL
RH BLOOD TYPE: POSITIVE
RUBV IGG SER QL: POSITIVE
RUBV IGG SER-ACNC: 94 IU/ML (ref 10–?)
T PALLIDUM AB SER QL IA: NONREACTIVE
VZV IGG SER IA-ACNC: 5.53 (ref 1–?)
WBC # BLD AUTO: 9 X10(3) UL (ref 4–11)

## 2025-03-03 PROCEDURE — 86901 BLOOD TYPING SEROLOGIC RH(D): CPT | Performed by: ADVANCED PRACTICE MIDWIFE

## 2025-03-03 PROCEDURE — 86803 HEPATITIS C AB TEST: CPT | Performed by: ADVANCED PRACTICE MIDWIFE

## 2025-03-03 PROCEDURE — 86850 RBC ANTIBODY SCREEN: CPT | Performed by: ADVANCED PRACTICE MIDWIFE

## 2025-03-03 PROCEDURE — 87389 HIV-1 AG W/HIV-1&-2 AB AG IA: CPT | Performed by: ADVANCED PRACTICE MIDWIFE

## 2025-03-03 PROCEDURE — 83036 HEMOGLOBIN GLYCOSYLATED A1C: CPT | Performed by: ADVANCED PRACTICE MIDWIFE

## 2025-03-03 PROCEDURE — 86762 RUBELLA ANTIBODY: CPT | Performed by: ADVANCED PRACTICE MIDWIFE

## 2025-03-03 PROCEDURE — 85025 COMPLETE CBC W/AUTO DIFF WBC: CPT | Performed by: ADVANCED PRACTICE MIDWIFE

## 2025-03-03 PROCEDURE — 83021 HEMOGLOBIN CHROMOTOGRAPHY: CPT | Performed by: ADVANCED PRACTICE MIDWIFE

## 2025-03-03 PROCEDURE — 83020 HEMOGLOBIN ELECTROPHORESIS: CPT | Performed by: ADVANCED PRACTICE MIDWIFE

## 2025-03-03 PROCEDURE — 87340 HEPATITIS B SURFACE AG IA: CPT | Performed by: ADVANCED PRACTICE MIDWIFE

## 2025-03-03 PROCEDURE — 87086 URINE CULTURE/COLONY COUNT: CPT | Performed by: ADVANCED PRACTICE MIDWIFE

## 2025-03-03 PROCEDURE — 86787 VARICELLA-ZOSTER ANTIBODY: CPT | Performed by: ADVANCED PRACTICE MIDWIFE

## 2025-03-03 PROCEDURE — 86780 TREPONEMA PALLIDUM: CPT | Performed by: ADVANCED PRACTICE MIDWIFE

## 2025-03-03 PROCEDURE — 86900 BLOOD TYPING SEROLOGIC ABO: CPT | Performed by: ADVANCED PRACTICE MIDWIFE

## 2025-03-05 LAB
HGB A2 MFR BLD: 2.5 % (ref 1.5–3.5)
HGB PNL BLD ELPH: 97.5 % (ref 95.5–100)

## 2025-03-12 ENCOUNTER — HOSPITAL ENCOUNTER (OUTPATIENT)
Dept: ULTRASOUND IMAGING | Facility: HOSPITAL | Age: 30
Discharge: HOME | End: 2025-03-12
Attending: ADVANCED PRACTICE MIDWIFE
Payer: COMMERCIAL

## 2025-03-12 DIAGNOSIS — N92.6 MISSED MENSES: ICD-10-CM

## 2025-03-12 PROCEDURE — 76801 OB US < 14 WKS SINGLE FETUS: CPT | Performed by: ADVANCED PRACTICE MIDWIFE

## 2025-03-14 ENCOUNTER — TELEPHONE (OUTPATIENT)
Dept: OBGYN CLINIC | Facility: CLINIC | Age: 30
End: 2025-03-14

## 2025-03-14 NOTE — TELEPHONE ENCOUNTER
Incoming Fax received from [facility] with patient's Panorama test results.    Sample collection date: 3/4/25  Report date: 3/8/25    Results:   Low Risk for Aneuploidies and Microdeletions  Fetal Sex: Available in report.  Fetal Fraction: 5.9%

## 2025-03-21 ENCOUNTER — INITIAL PRENATAL (OUTPATIENT)
Dept: OBGYN CLINIC | Facility: CLINIC | Age: 30
End: 2025-03-21
Payer: COMMERCIAL

## 2025-03-21 VITALS
BODY MASS INDEX: 27 KG/M2 | DIASTOLIC BLOOD PRESSURE: 80 MMHG | SYSTOLIC BLOOD PRESSURE: 126 MMHG | WEIGHT: 157 LBS | HEART RATE: 76 BPM

## 2025-03-21 DIAGNOSIS — Z34.01 PRENATAL CARE, FIRST PREGNANCY IN FIRST TRIMESTER (HCC): Primary | ICD-10-CM

## 2025-03-21 PROBLEM — Z34.90 PREGNANCY (HCC): Status: ACTIVE | Noted: 2025-03-21

## 2025-03-21 PROCEDURE — 3074F SYST BP LT 130 MM HG: CPT | Performed by: ADVANCED PRACTICE MIDWIFE

## 2025-03-21 PROCEDURE — 3079F DIAST BP 80-89 MM HG: CPT | Performed by: ADVANCED PRACTICE MIDWIFE

## 2025-03-21 NOTE — PROGRESS NOTES
Sarina, , is at 12w5d, here for her NOB visit.  Currently, she is feeling well. Denies 2nd trimester danger signs.     Vital signs and weight reviewed  See flowsheets & Prenatal Physical  Dating scan consistent with LMP dating    Patient Active Problem List   Diagnosis    Pregnancy (HCC)      Assessment/Plan: PE today. GC/CT sent. Pt to upload pap results  Next visit: 4 weeks    Reviewed:   Prenatal visit schedule  2nd trimester precautions and expectations  Danger signs    Pt verbalized understanding. All questions answered. No barriers to learning identified

## 2025-03-21 NOTE — PATIENT INSTRUCTIONS
Healthy Eating Habits During Pregnancy    It’s important to develop healthy eating habits while you are pregnant, for you as well as for your baby. Here are some ways to stay healthy.  Aim for a healthy weight  A slow, steady rate of weight gain is often best. After the first trimester, you may gain about a pound a week. If you were overweight before pregnancy, you need to gain fewer pounds. Your healthcare provider can give you a healthy weight goal for your pregnancy.  Don’t diet  Now is not the time to diet. You may not get enough of the nutrients you and your baby need. Instead, learn how to be a healthy eater. Start by doing it for your baby. Soon, you may do it for yourself.  Vitamins and supplements  Talk with your healthcare provider about taking these and other prenatal vitamins and supplements.  Iron makes the extra blood you need now.  Calcium and vitamin D help build and keep strong bones.  Folic acid helps prevent certain birth defects.  Iodine helps the thyroid work right.  Some vitamins may not be safe to take. Your healthcare provider will tell you which ones to avoid.  Fluids  Drink at least 8 to 10 cups of fluid daily. Your baby needs fluids. Fluids also decrease constipation, flush out toxins and waste, limit swelling, and help prevent bladder infections. Water is best. Other good choices are:  Water or seltzer water with a slice of lemon or lime (These can also help ease an upset stomach.)  Clear soups that are low in salt  Low-fat or fat-free milk, soy or rice milk with calcium added  Popsicles or gelatin  Things to avoid  Some things might harm your growing baby. Don’t eat or drink:  Alcohol  Unpasteurized dairy foods and juices  Raw or undercooked meat, poultry, fish, or eggs  Unwashed fruits and vegetables  Prepared meats, like deli meats or hot dogs, unless heated until steaming hot  Fish that are high in mercury, like shark, swordfish, margoth mackerel, tilefish, and albacore tuna  Things to  limit  Ask your healthcare provider whether it’s safe to eat or drink:  Caffeine  Artificial sweeteners  Organ meats  Certain types of fish  Fish and shellfish that contain mercury in lower amounts, like shrimp, canned light tuna, salmon, pollock, and catfish  [employer] last reviewed this educational content on 2018 [employer]. [address] [place] [address], [place] [address]. All rights reserved. This information is not intended as a substitute for professional medical care. Always follow your healthcare professional's instructions.        Nutrition During Pregnancy    Having a healthy baby depends mostly on you. What you eat matters to your baby and your health. During pregnancy, you will likely need about 300 more calories per day than before you became pregnant. Each day, try to eat the number of servings listed here for each food group. In addition, cut down on salt and caffeine. Limit the amount of sweets and high-fat foods you eat. Don’t smoke or drink alcohol.  Important: See your healthcare provider as often as requested. If you have any questions, be sure to ask them.  Fruits  2 cups  Examples of 1-cup servings:  1 medium apple  1 medium orange  1 medium banana  1 cup chopped fruit  1 cup 100% fruit juice (pasteurized)  1/2 cup dried fruit Vegetables  2-1/2 to 3 cups   Examples of 1 servin cups raw, leafy greens  1 cup raw or cooked cut-up vegetables  1 cup 100% vegetable juice (pasteurized) Grains & Cereals*  6 to 8 ounces  Examples of 1-ounce servings:  1 slice bread  1/2 cup cooked rice  1/2 cup cooked cereal  1/2 cup pasta  1 ounce cold cereal Fats & Oils  6 to 8 teaspoons   Dairy**  3 cups  Examples of 1-cup servings:  1 cup milk  1 cup yogurt  1-1/2 ounces natural cheese  2 ounces processed cheese Protein---  5 to 6-1/2 ounces  Examples of 1-ounce servings:  1 egg  1 ounce of lean meat, poultry, or fish  1/4 cup cooked beans  1 tablespoon peanut butter  1/2 ounce nuts  Fluids  8 or more 8-ounce glasses  Examples:  Water  Diluted juices: Apple, orange, cranberry  Mineral water  Clear soups, broth     *Note: Choose whole grains whenever possible.  ** Note: Try to choose low-fat options; avoid soft cheeses and unpasteurized milk.  --- Notes: Avoid raw or undercooked meats, eggs, and seafood. fish, and shellfish. Also, some types of fish, like shark, swordfish, and margoth mackerel should not be eaten during pregnancy. Avoid hot dogs, luncheon meats, and cold cuts unless heated to steaming just before being served. Ask your healthcare provider about safe choices.     Prenatal supplements  A prenatal supplement is a pill that you take daily during pregnancy. It helps make sure you’re getting the right amount of certain nutrients that are important to your baby. Ask your healthcare provider to help you choose the best one for you. Important nutrients during pregnancy include:  Folic acid. It's best to start taking this supplement 1 month before you start trying to get pregnant. Folic acid helps prevent certain problems in your baby. During pregnancy, you need to take 400 micrograms (mcg) of folic acid every day for the first 2 to 3 months after conception, and then 600 mcg is needed for growing fetus and placenta.  Iron, calcium, and vitamin D. You may also be advised to take these supplements during pregnancy. They help keep you and your baby healthy. Be sure to take them at different times because calcium makes it hard for the body to absorb iron. Taking iron with orange juice helps to increase its absorption.   [employer] ([employer]) last reviewed this educational content on 12/1/2017 © 2000-2020 [employer]. [address] [place] [address], [place] [address]. All rights reserved. This information is not intended as a substitute for professional medical care. Always follow your healthcare professional's instructions.        Pregnancy: Planning Your Exercise Routine    While you’re pregnant, an  exercise routine helps both your mind and your body feel good. It tones your muscles and makes them stronger. It also gives you and your baby more oxygen.  The right exercise for you  Overall conditioning is best for you and your baby. Try walking, swimming, or riding a stationary bike. Always warm up, cool down, and drink enough fluids. Keep a snack close by in case your blood sugar gets low. Discuss exercise choices with your healthcare provider. Talk about the following:  If you already exercise, find out how to adapt your routine while you’re pregnant. Keep the intensity of the exercise moderate.  Ask if there are any local prenatal exercise classes, like yoga or water aerobics. Find out which prenatal exercise videos are good choices.  If you were not exercising before your pregnancy, find out the best way to start. Now is not the time to begin a new workout on your own. Start slowly. Listen to your body.  Ask which forms of exercise you should avoid. These may include risky activities like hot yoga, horseback riding, scuba diving, skiing, skating, and contact sports.  Pelvic tilts  These help strengthen your stomach muscles and low back. You can do pelvic tilts instead of sit-ups.  Do this exercise on your hands and knees.  Relax the back of your neck. Pull your stomach in until your low back flattens.  Hold for 30 seconds. Release. Repeat 10 times. Do this twice a day.  Kegel exercises  Kegel exercises strengthen the pelvic muscles. Doing Kegels daily helps prepare these muscles for delivery. Kegels also help ease your recovery. You exercise these muscles by tightening, holding, then relaxing them. To do 1 type of Kegel exercise, contract as if you were stopping your urine stream (but do it when you’re not urinating). Hold for 10 seconds, then repeat 10 times, a few times a day.  Tips to add activity  Here are some tips to follow:  Park the car farther from a store and walk.  If you can, do errands on foot  instead of driving.  Walk across the office to talk to someone in person instead of calling.  While waiting for appointments, go up and down stairs or around the block.   Tips to stay active  Here are some tips to follow:  Maintain your routine. But exercise less intensely if you feel tired.  Base your workout on how you feel, not your heart rate. Heart rates aren’t a good way to measure effort during pregnancy.  Avoid exercising on your back after week 16.   What are the warning signs that I should stop exercising?  Stop exercising and call your healthcare provider if you have any of these symptoms:  Vaginal bleeding   Dizziness or feeling faint   Increased shortness of breath   Chest pain   Headache   Muscle weakness   Calf (back of the leg) pain or swelling    Uterine contractions or pre-term labor   Decreased fetal movement   Fluid leaking (or gushing) from your vagina  [employer] last reviewed this educational content on 1/1/2018  © 2209-8627 [employer]. [address] [place] [address]. All rights reserved. This information is not intended as a substitute for professional medical care. Always follow your healthcare professional's instructions.        Exercise During Pregnancy  Regular exercise can help you adapt to the changes your body is going through during pregnancy. Exercising may help you relax, and it gets you ready for labor and delivery. Talk to your healthcare provider about the kinds of activities you can do. Then go ahead and enjoy them.    Get started  Even if you didn’t exercise before pregnancy, it is not too late to start. Choose an activity that you like and that fits your lifestyle. Begin slowly and build up a little at a time. Be sure to check with your healthcare provider before starting any exercise program. The following tips may help you get started:  Choose a time and place to exercise each day.  Wear loose-fitting clothes and comfortable athletic shoes.  Stretch  before and after you exercise. (Be sure to stretch slowly and to hold stretches for 30 to 40 seconds.)  Be active  Unless your healthcare provider says otherwise, try to exercise for 30 minutes or more most days of the week:  Overall conditioning, like swimming, bicycling, or walking, is especially beneficial.  Aerobics and exercises that increase your pulse rate help condition your body and strengthen your heart. Ask about special prenatal aerobics classes.  Exercise safely  These tips will help you have a safe, healthy workout:  Stay cool. Stop exercising if you feel overheated.  Slow down if you’re out of breath. If you can’t talk during exercise, lower the intensity of the workout.  Monitor the intensity of your workout. Only do moderate-intensity (not strenuous) exercise.  Stay off your back. Lying on your back can decrease blood flow to your baby.  Drink water before, during and after your workout.  Eat 300 extra calories a day. A light snack before and after you exercise will help keep your energy up.  Avoid activities requiring balancing skills later in pregnancy.  Do Kegel exercises  Kegel exercises strengthen the pelvic floor muscles used in childbirth. These muscles are the same ones used to stop the flow of urine. Do Kegel exercises daily:  Squeeze your pelvic floor muscles for a count of 3.  Relax, then squeeze again.  Repeat 10 to 15 times in a row at least 3 times a day.  You can do Kegel exercises anytime and anywhere.  Keep walking  No matter what other exercise you do, try to walk whenever you can:  If you’re working all day, take a lunchtime walk in the park with a friend.  When you shop, park away from the store entrance and walk the extra distance.  Take the stairs instead of the elevator.     When to stop exercising and call your healthcare provider  Call your healthcare provider right away if you have:  Shortness of breath before starting exercise  Vaginal bleeding  Dizziness or feeling  faint  Chest pain  Headache  Decreased fetal movement   contractions   Muscle weakness  Calf pain or swelling  Fluid leaking from the vagina   [employer] last reviewed this educational content on 2017 [employer]. [address] [place] [address], [place] [address]. All rights reserved. This information is not intended as a substitute for professional medical care. Always follow your healthcare professional's instructions.        Pregnancy: Your Weight     Your weight will be checked regularly by your healthcare provider.   Being a healthy weight is important for both you and your baby. The weight you gain now is not just extra fat. It is also the weight of your baby. And it is the increased blood and fluids to support the baby. A slow, steady rate of gain is best. How much you should gain depends on your weight before getting pregnant. Check with your healthcare provider to find out what is right for you.  Talk to your healthcare provider if you have any questions.   If you gain too much  Gaining too much weight might cause you to feel tired or you could have a harder pregnancy or birth. If you and your healthcare provider decide you’re gaining too much:  Eat fewer fats and sugars. Instead, eat fruit, vegetables, and whole-grain foods.  Drink plenty of water between meals.  Get at least 20 minutes of light exercise, like walking, each day.  Don’t diet. You might not get enough of the nutrients you or your baby needs.  Keep a diet diary to help you gauge what and how much you are eating.  If you’re not gaining enough  If you don’t gain enough, your baby could be too small or have health problems. Women tend to gain most of their weight in the second and third trimesters. For now:  Eat many types of foods. Make sure you get enough calcium, protein, and carbohydrates.  Don’t skip meals.  Eat healthy snacks.  Pick nutrient-dense, high-calorie healthy food like trail mix or protein  ameya.  See a dietitian for help.  Talk to your healthcare provider if you have had an eating disorder or problems with certain foods.  The following are ways to get more calories:  Eat breakfast every day. Peanut butter or a slice of cheese on toast can give you an extra protein boost.  Snack between meals. Yogurt and dried fruits can provide protein, calcium, and minerals.  Try to eat more foods that are high in good fats, like nuts, fatty fish, avocados, and olive oil.  Drink juices made from real fruit that are high in vitamin C or beta carotene, like grapefruit juice, orange juice, papaya nectar, apricot nectar, and carrot juice.  Avoid junk food, like foods high in sugar.  [employer] last reviewed this educational content on 1/1/2018 © 2000-2020 The [employer]. [address] [place] [address]. All rights reserved. This information is not intended as a substitute for professional medical care. Always follow your healthcare professional's instructions.        Dental Care for Pregnant Women  Pregnancy is a time when your oral health needs more attention. Hormone changes during pregnancy can cause certain problems with teeth and gums, and make treatment more complicated.  How pregnancy affects oral health  During pregnancy, hormone changes can cause swollen, bleeding, and irritated gums (gingivitis). Your gums may be very sore, and brushing and flossing may cause discomfort. If left untreated, gingivitis can lead to a more serious gum disease called periodontitis. Severe periodontitis can lead to tooth loss.  Some pregnant women also have small bright-red growths on their gums that bleed easily. These are often called “pregnancy tumors.” They are not cancer. They usually go away right after birth. Talk with your dentist or healthcare provider if you have concerns.  Keeping a healthy mouth  Brush twice daily with fluoride toothpaste. Floss at least once a day.  If you have morning sickness,  rinse your mouth with a teaspoon of baking soda mixed with water after vomiting. Do not brush your teeth right after vomiting. This can remove tooth enamel.  See your dentist for cleanings and checkups more often if needed. This is especially true in your second and third trimesters.  Ask your dentist or healthcare provider if you should use a special mouth rinse to help prevent gingivitis.  Tell your dentist or healthcare provider about any changes in your mouth, such as soreness or bleeding.  Safety concerns  Make sure to tell your dentist that you’re pregnant. He or she can help you stay safe. If you need to have dental X-rays during pregnancy, he or she will make sure you are fully protected. You will wear a lead apron over your belly during the X-ray process. The apron helps block radiation from the X-rays.  If you need to take medicines like antibiotics or pain relievers for dental problems, talk with your healthcare providers first. Your providers will discuss the risks and benefits of taking these during pregnancy.  If you have a high-risk pregnancy, your dentist and your healthcare provider may advise that certain dental treatments wait until after you give birth.  [employer] last reviewed this educational content on 12/1/2017 © 2000-2020 [employer]. [address] [place] [address], [place] [address]. All rights reserved. This information is not intended as a substitute for professional medical care. Always follow your healthcare professional's instructions.       Pregnancy: Your First Trimester Changes  The first trimester is a time of rapid development for your baby. Because your baby is growing so quickly, it is important that you start a healthy lifestyle right away. By the end of the first trimester, your baby has formed all of its major body organs and weighs just over an ounce.     Actual size of baby is 1/4\"    Month 1 (weeks 1 to 4)  The placenta (the organ that nourishes your baby) begins to  form. The brain, spinal cord, heart, gastrointestinal tract, and lungs begin to develop. Your baby is about 1/4-inch long by the end of the first month.     Actual size of baby is 1\"      Month 2 (weeks 5 to 8)  All of your baby’s major body organs form. The face, fingers, toes, ears, and eyes appear. By the end of the month, your baby is about 1-inch long.     Actual size of baby is 3\"      Month 3 (weeks 9 to 12)  Your baby can open and close its fists and mouth. The sexual organs begin to form. As the first trimester ends, your baby is about 3-inches long.  [employer] last reviewed this educational content on 10/1/2017  © 9470-7732 [employer]. [address] [place] [address], [place] [address]. All rights reserved. This information is not intended as a substitute for professional medical care. Always follow your healthcare professional's instructions.        Adapting to Pregnancy: First Trimester    As your body adjusts, you may have to change or limit your daily activities. You’ll need more rest. You may also need to use the energy you have more wisely.  Your changing body  Almost every part of your body is affected as you adapt to pregnancy. The uterus and cervix will begin to soften right away. You may not look very pregnant during the first 3 months. But you are likely to have some common signs of early pregnancy:  Nausea  Fatigue  Frequent urination  Mood swings  Bloating of the belly  Constipation  Heartburn  Missed or light periods (first trimester bleeding)  Nipple or breast tenderness and breast swelling  It’s not too late to start good habits  What matters most is protecting your baby from this moment on. If you smoke, drink alcohol, or use drugs, now is the time to stop. If you need help, talk with your healthcare provider:  Smoking increases the risk of stillbirth or having a low-birth-weight baby. If you smoke, quit now.  Alcohol and drugs have been linked with miscarriage, birth defects,  intellectual disability, and low birth weight. Do not drink alcohol or take drugs.  Tips to relieve nausea  Although nausea can happen at any time of the day, it may be worse in the morning. To help prevent nausea:  Eat small, light meals at frequent intervals.  Drink fluids often.  Get up slowly. Eat a few unsalted crackers before you get out of bed.  Avoid smells that bother you.  Avoid spicy and fatty foods.  Eat an ice pop in your favorite flavor.  Get plenty of rest.  Ask your healthcare provider about taking sai or vitamin B6 for nausea and vomiting.  Talk with your healthcare provider if you take vitamins that upset your stomach.  Work concerns  The end of the first trimester is a good time to discuss working during pregnancy with your employer. Follow your healthcare provider’s advice if your job needs you to stand for a long time, work with hazardous tools, or even sit at a desk all day. Your workspace, workload, or scheduled hours may need to be adjusted. Perhaps you can change body postures more often or take an extra break.  Advice for travel  Talk to your healthcare provider first, but the second trimester may be the best time for any travel. You may be advised to avoid certain trips while you’re pregnant. Food and water can be concerns in developing countries. Travel by car is a good choice, as you can stop, get out, and stretch. Bring snacks and water along. Fasten the lap belt below your belly, low over your hips. Also be sure to wear the shoulder harness.  Intimacy  Unless your healthcare provider tells you to, there is no reason to stop having sex while you’re pregnant. You or your partner may notice changes in desire. Desire may be less in the first trimester, due to nausea and fatigue. In the second trimester, sex may be very enjoyable. The third trimester can be a challenge comfort-wise. Try different positions and see what’s best for you both.  [employer]Well last reviewed this educational content  on 10/1/2017  © 8861-4838 [employer]. [address] [place] [address], [place] [address]. All rights reserved. This information is not intended as a substitute for professional medical care. Always follow your healthcare professional's instructions.        Comfort Tips During Pregnancy    Talk with your healthcare provider before using pain-relieving medicine at any time during your pregnancy.  First trimester tips  Nausea  Get up slowly. Eat a few unsalted crackers before you get out of bed.  Avoid smells that bother you.  Eat small bland low fat, light high-protein meals at frequent intervals.  Sip on water, weak tea, or clear soft drinks, like ginger ale. Eat ice chips.  Fatigue  Take catnaps when you can.  Get regular exercise.  Accept help from others.  Practice good sleep habits, like going to bed and getting up at the same time each day. Use your bed only for sleep and sex.  Mood swings  Talk about your feelings with others, including other mothers.  Limit sugar, chocolate, and caffeine.  Eat a healthy diet. Don’t skip meals.  Get regular exercise.  Headaches  Get fresh air and exercise.  Relax and get enough rest.  Check with your healthcare provider before taking any pain medicines.  Second trimester tips  Here are some suggestions to help you cope:  To limit ankle swelling, sit with your feet raised or wear support hose.  If you have pain in your groin and stomach (round ligament pain), avoid sudden twisting movements.  For leg cramps, flexing your foot often brings immediate relief. You also may try massaging your calf in long, downward strokes, or stretching your legs before going to bed. Get enough exercise and wear shoes with flexible soles.  Third trimester tips  Reducing heartburn  Eat small, light meals throughout the day rather than 3 large ones.  Sleep with your upper body raised 6 inches. Don’t lie down until 2 hours after you eat.  Don't eat greasy, fried, or spicy foods.  Avoid citrus  fruits and juices.  Treating constipation  Eat foods high in fiber (whole-grain foods, fresh fruit and vegetables).  Drink plenty of water.  Get regular exercise.  Discuss other medicines (like docusate and psyllium) with your healthcare provider.  Taking care of your breasts  Avoid using harsh soaps or alcohol, which can cause excessive dryness.  Wear nursing bras. They provide more support than regular bras and can be used after pregnancy if you breastfeed.  Getting a good night’s sleep  Take a warm shower before bed.  Sleep on a firm mattress.  Lie on your side with 1 leg crossed over the other.  Use pillows to support arms, legs, and belly.  [employer] last reviewed this educational content on 10/1/2017  © 9012-3793 [employer]. [address] [place] [address], [place] [address]. All rights reserved. This information is not intended as a substitute for professional medical care. Always follow your healthcare professional's instructions.        Bleeding During Early Pregnancy     Ultrasound can help check the health of your fetus.     If you’ve had bleeding early in your pregnancy, you’re not alone. Many other pregnant women have had early bleeding, too. And in most cases, nothing is wrong. But your healthcare provider still needs to know about it. He or she may want to do tests to find out why you’re bleeding. Call your healthcare provider if you notice bleeding during pregnancy.  What causes early bleeding?  The cause of bleeding early in pregnancy is often unknown. But many factors early on in pregnancy may lead to bleeding or spotting. These include sexual intercourse, which may cause bleeding in any trimester. Here are some other causes:  Implantation of the embryo on the uterine wall  Subchorionic hemorrhage (bleeding between the sac membrane and the uterus)  Miscarriage  Ectopic (tubal) pregnancy  If you notice spotting  Spotting (very light bleeding) is the most common type of bleeding in early  pregnancy. If you notice it, call your healthcare provider. Chances are, he or she will tell you that you can care for yourself at home.  If tests are needed  Depending on how much you bleed, your healthcare provider may ask you to come in for some tests. A pelvic exam, for instance, can help see how far along your pregnancy is. You also may have an ultrasound or a Doppler test. These imaging tests use sound waves to check the health of your fetus. The ultrasound may be done on your belly or inside your vagina. Your healthcare provider also may order a special blood test. This test compares your hormone levels in blood samples taken 2 days apart. The results can help your healthcare provider learn more about the implantation of the embryo. Your blood type will also need to be checked to evaluate whether you will need to be treated for Rh sensitization.   Warning signs  If your bleeding doesn’t stop or if you notice any of the following, seek medical help right away:  Soaking a sanitary pad each hour  Bleeding like you’re having a period  Cramping or severe belly pain  Feeling dizzy or faint  Tissue passing through your vagina  Bleeding at any time after the first trimester  Questions you may be asked  Though not normal, bleeding early in pregnancy is common. If you’ve noticed any bleeding, you may be concerned. But keep in mind that bleeding alone doesn’t mean something is wrong. Call your healthcare provider right away, though. He or she may ask you questions like these to help find the cause of your bleeding:  When did your bleeding start?  Is your bleeding very light (spotting) or is it like a period?  Is the blood bright red or brownish?  Have you had sexual intercourse recently?  Have you had pain or cramping?  Have you felt dizzy or faint?  Monitoring your pregnancy  Bleeding will often stop as quickly as it began. Your pregnancy may go on a normal path again. You may need to make a few extra prenatal visits.  But you and your baby will most likely be fine.  [employer] last reviewed this educational content on 6/1/2016  © 2446-0957 [employer], [employer]. [address] [place] [address], [place] [address]. All rights reserved. This information is not intended as a substitute for professional medical care. Always follow your healthcare professional's instructions.        Abdominal Pain and Early Pregnancy    The tests you had show that you are pregnant, but the exact cause of your pain isn’t clear.   Some pain and bleeding are common early in pregnancy. Often they stop, and you can go on to have a normal pregnancy and baby. Other times the pain or bleeding can be signs of a miscarriage or ectopic pregnancy. An ectopic pregnancy is a very serious problem. At this time it is unclear if your pregnancy will continue normally, if you will have a miscarriage, or if you could have an ectopic pregnancy. Below is some information about this.   Miscarriage  At this time we don’t know whether you will have a miscarriage, or if things will clear up and your pregnancy will continue normally. We understand that this is emotionally difficult. There is little we can say to change the way you feel. But understand that miscarriages are common.   About 1 or 2 out of every 10 pregnancies end this way. Some end even before you know you are pregnant. This happens for a number of reasons, and usually we never figure out why. It’s important you know that it is not your fault. It didn’t happen because you did anything wrong.   Having sex or exercising does not cause a miscarriage. These activities are usually safe unless you have pain or bleeding or your healthcare provider tells you to stop. Even minor falls won’t cause a miscarriage. Miscarriages happen because things were not developing as they were supposed to. No medicine can prevent a miscarriage.   Ectopic pregnancy  In a normal pregnancy, the fertilized egg attaches to the wall of the womb  (uterus). In an ectopic or tubal pregnancy, the fertilized egg attaches outside the uterus, usually in the fallopian tube. Very rarely, the egg attaches to an ovary or somewhere else in the abdomen. An ectopic pregnancy is much less common than a miscarriage, but it is very serious. The baby can't survive, and as it grows it can rupture the tube. This can cause internal bleeding and even death. Risk factors for an ectopic are:   An ectopic pregnancy in the past  Pelvic inflammatory disease (PID)  Endometriosis  Smoking  An IUD  Additional tests  Because we don’t know what’s causing your symptoms, you will need more tests to figure out what the problem is. You may need the following.   Ultrasound  An ultrasound can usually find a normal pregnancy as early as 4 to 5 weeks along. If the ultrasound does not show the baby inside the uterus, it means one of the following.   You have a normal pregnancy less than 4 weeks along  You are having or recently had a miscarriage  You have an ectopic pregnancy  Quantitative HCG  This test measures the amount of a pregnancy hormone in your blood. Comparing today's test result to a repeat test in 2 days will show whether you have a normal pregnancy.   Laparoscopy  This is a type of surgery. The healthcare provider will put a tube with a light inside your belly (abdomen) to look directly at your pelvic organs. This test is used when it is not safe to wait 2 days for blood test results.   Important information  If you do have an ectopic pregnancy, there is a small chance that the growing fetus can tear the fallopian tube. This can cause severe internal bleeding. If this happens, you may have:   Sudden severe pain in your lower abdomen  Vaginal bleeding  Weakness, dizziness, and sometimes fainting  If any of these symptoms occur:  Call 911or return right away to the hospital.  Don't drive yourself.  Don't go to your healthcare provider's office or to a clinic. Go to the hospital.  Home  care  Follow these guidelines to help care for yourself at home:   Rest until your next exam. Don’t do anything strenuous.  Eat a light diet with foods that are easy to digest.  Don’t have sex until your healthcare provider says it’s OK.  Follow-up care  Follow up with your healthcare provider, or as advised. If you were told to have a repeat blood test in 2 days, it’s important to get it done.   If you had an X-ray or ultrasound, a radiologist will review it. You will be told of any new findings that may affect your care.   Call 911  Call 911 if you have any of these:   Severe pain and very heavy bleeding  Severe lightheadedness, passing out, or fainting  Rapid heart rate  Trouble breathing  Confused or difficulty waking up  When to seek medical advice  Call your healthcare provider right away if any of these occur:   The pain in your abdomen gets worse, either suddenly or gradually.  You are dizzy or weak when you stand.  You have heavy vaginal bleeding. This means soaking 1 pad an hour for 3 hours.  You have vaginal bleeding for more than 5 days.  You have repeated vomiting or diarrhea.  The pain in your abdomen moves to the lower right.  You have blood in your vomit or bowel movements. This will be dark red or black.  You have a fever of 100.4ºF (38ºC) or higher, or as directed by your healthcare provider.  [employer] last reviewed this educational content on 9/1/2019 © 2000-2020 The [employer]. [address] [place] [address]. All rights reserved. This information is not intended as a substitute for professional medical care. Always follow your healthcare professional's instructions.       Comfort Tips During Pregnancy    Talk with your healthcare provider before using pain-relieving medicine at any time during your pregnancy.  First trimester tips  Nausea  Get up slowly. Eat a few unsalted crackers before you get out of bed.  Avoid smells that bother you.  Eat small bland low fat, light  high-protein meals at frequent intervals.  Sip on water, weak tea, or clear soft drinks, like ginger ale. Eat ice chips.  Fatigue  Take catnaps when you can.  Get regular exercise.  Accept help from others.  Practice good sleep habits, like going to bed and getting up at the same time each day. Use your bed only for sleep and sex.  Mood swings  Talk about your feelings with others, including other mothers.  Limit sugar, chocolate, and caffeine.  Eat a healthy diet. Don’t skip meals.  Get regular exercise.  Headaches  Get fresh air and exercise.  Relax and get enough rest.  Check with your healthcare provider before taking any pain medicines.  Second trimester tips  Here are some suggestions to help you cope:  To limit ankle swelling, sit with your feet raised or wear support hose.  If you have pain in your groin and stomach (round ligament pain), avoid sudden twisting movements.  For leg cramps, flexing your foot often brings immediate relief. You also may try massaging your calf in long, downward strokes, or stretching your legs before going to bed. Get enough exercise and wear shoes with flexible soles.  Third trimester tips  Reducing heartburn  Eat small, light meals throughout the day rather than 3 large ones.  Sleep with your upper body raised 6 inches. Don’t lie down until 2 hours after you eat.  Don't eat greasy, fried, or spicy foods.  Avoid citrus fruits and juices.  Treating constipation  Eat foods high in fiber (whole-grain foods, fresh fruit and vegetables).  Drink plenty of water.  Get regular exercise.  Discuss other medicines (like docusate and psyllium) with your healthcare provider.  Taking care of your breasts  Avoid using harsh soaps or alcohol, which can cause excessive dryness.  Wear nursing bras. They provide more support than regular bras and can be used after pregnancy if you breastfeed.  Getting a good night’s sleep  Take a warm shower before bed.  Sleep on a firm mattress.  Lie on your side  with 1 leg crossed over the other.  Use pillows to support arms, legs, and belly.  [employer] last reviewed this educational content on 10/1/2017  © 2101-7619 [employer], [employer]. [address] [place] [address], [place] [address]. All rights reserved. This information is not intended as a substitute for professional medical care. Always follow your healthcare professional's instructions.        Adapting to Pregnancy: Second Trimester    Keep up the healthy habits you started in your first trimester. You might be a little more tired than normal. So plan your day wisely. Look at the tips below and choose the ones that suit your lifestyle.  If you have any questions, check with your healthcare provider.  If you work  If you can, adjust your work with your employer to fit your needs. Try these tips:  If you stand for long periods, find ways to do some tasks while sitting. Also, try to stand with 1 foot resting on a low stool or ledge. Shift your weight from foot to foot often. Wear low-heeled shoes.  If you sit, keep your knees level with your hips. Rest your feet on a firm surface. Sit tall with support for your low back.  If you work long hours, ask about adjusting your schedule. Try taking shorter breaks more often.  When you travel  The second trimester may be the best time for any travel. Talk to your healthcare provider about any special plans you may need to make. Always:  Wear a seat belt. Fasten the lap part under your belly. Wear the shoulder part also.  Take breaks often during long trips by car or plane. Move around to stretch your legs.  Drink plenty of fluids on flights. The air in plane cabins is very dry.  Avoid hot climates or high altitudes if you are not used to them.  Avoid places where the food and water might make you sick.  Make sure you are up-to-date on all immunizations, including the flu vaccine. This is especially important when traveling overseas.  Taking time to relax  Find time to rest and  relax at work or at home:  Take short time-outs daily. Do relaxation exercises.  Breathe deeply during stressful times.  Try not to take on too much. Plan tasks for times when you have the most energy.  Take naps when you can. Or just sit and relax.  After week 16, avoid lying on your back for more than a few minutes. Instead, lie on your side. Switch sides often.  Continuing as lovers  Unless your healthcare provider tells you otherwise, there is no reason to stop having sex now. Blood supply increases to the pelvic area in the second trimester. Because of this, sex might be more enjoyable. Try different positions and see what’s best. Also, talk to your partner about any changes in desire. Spotting may happen after sex. Be sure to let your healthcare provider know if there is heavy bleeding.  Keeping your environment safe  You can still clean house and use scented products. Just take some simple precautions:  Wear gloves when using cleaning fluids.  Open windows to let in fresh air. Use a fan if you paint.  Avoid secondhand smoke.  Don’t breathe fumes from nail polish, hair spray, cleansers, or other chemicals.  [employer] last reviewed this educational content on 1/1/2018  © 6332-0344 The [employer]. [address] [place] [address]. All rights reserved. This information is not intended as a substitute for professional medical care. Always follow your healthcare professional's instructions.        Pregnancy: Your Second Trimester Changes  Each day, you and your baby are changing and growing together. Here’s a quick look at what’s happening to both of you.  How you are changing  Even when you don’t notice it, your body is adapting to meet the needs of your growing baby. The changes in your body might also affect your moods.  Your body  Your uterus expands as baby grows. As the weeks go by, you will feel more pressure on your bladder, stomach, and other organs. You may notice some skin color  changes on your forehead, nose, or cheeks. Freckles may darken, and moles may grow. You may notice a darker line on your abdomen between your belly button and pubic bone in the midline.  Your moods  The second trimester is often easier than the first. Still, be prepared for mood swings. These are due to the increase in hormones (chemicals that affect the way organs work) produced by your body. These mood swings are a normal part of pregnancy.  How your baby is growing          Month 4  Baby’s heartbeat may be heard with a Doppler (hand-held ultrasound device) by 9 to 10 weeks. Eyebrows, eyelashes, and fingernails begin to form.  Month 5  You may feel your baby move. After a growth spurt, your baby nears 10 inches. Month 6  Baby’s fingerprints have formed. Your baby weighs about 1 to 2 pounds and is about 12 inches long.   [employer] last reviewed this educational content on 1/1/2018  © 6218-9246 The [employer], [employer]. [address] [place] [address], [place] [address]. All rights reserved. This information is not intended as a substitute for professional medical care. Always follow your healthcare professional's instructions.

## 2025-03-24 LAB
C TRACH DNA SPEC QL NAA+PROBE: NEGATIVE
N GONORRHOEA DNA SPEC QL NAA+PROBE: NEGATIVE

## 2025-04-16 ENCOUNTER — ROUTINE PRENATAL (OUTPATIENT)
Dept: OBGYN CLINIC | Facility: CLINIC | Age: 30
End: 2025-04-16
Payer: COMMERCIAL

## 2025-04-16 ENCOUNTER — PATIENT MESSAGE (OUTPATIENT)
Dept: OBGYN CLINIC | Facility: CLINIC | Age: 30
End: 2025-04-16

## 2025-04-16 VITALS
HEART RATE: 71 BPM | SYSTOLIC BLOOD PRESSURE: 119 MMHG | BODY MASS INDEX: 27 KG/M2 | DIASTOLIC BLOOD PRESSURE: 74 MMHG | WEIGHT: 156 LBS

## 2025-04-16 DIAGNOSIS — Z36.89 ENCOUNTER FOR FETAL ANATOMIC SURVEY (HCC): ICD-10-CM

## 2025-04-16 DIAGNOSIS — Z34.92 PRENATAL CARE, SECOND TRIMESTER (HCC): Primary | ICD-10-CM

## 2025-04-16 PROCEDURE — 3078F DIAST BP <80 MM HG: CPT | Performed by: ADVANCED PRACTICE MIDWIFE

## 2025-04-16 PROCEDURE — 3074F SYST BP LT 130 MM HG: CPT | Performed by: ADVANCED PRACTICE MIDWIFE

## 2025-04-16 NOTE — PROGRESS NOTES
Active fetus Denies any complaints.  Denies any vaginal bleeding, leaking of fluid or vaginal discharge. Reviewed AFP - pr will declide.  Anatomy scan ordered  Warning signs reviewed  All questions answered.

## 2025-04-17 ENCOUNTER — TELEPHONE (OUTPATIENT)
Dept: OBGYN CLINIC | Facility: CLINIC | Age: 30
End: 2025-04-17

## 2025-04-21 NOTE — TELEPHONE ENCOUNTER
Received Family Medical Leave Act forms in the forms department via e-mail. Sent [employer]k message for authorization. Logged for processing.

## 2025-04-23 ENCOUNTER — TELEPHONE (OUTPATIENT)
Dept: OBGYN CLINIC | Facility: CLINIC | Age: 30
End: 2025-04-23

## 2025-04-23 NOTE — TELEPHONE ENCOUNTER
Pt verified name and .     Pt expresses that they have had difficulty with scheduling anatomy scan with [facility] office. Phone number and instructions provided for scheduling. Pt verbalized understanding and agreed. Advised that message was sent to CNM on call regarding letter for proof of pregnancy with M[facility]ELYN. Advised that letter will be uploaded to [employer]. Pt verbalized understanding and agreed.

## 2025-04-25 NOTE — TELEPHONE ENCOUNTER
Bindu,    Please sign off on form if you agree to: Family Medical Leave Act maternity leave    -Signature page will be the first page scanned  -From your Inbasket, Highlight the patient and click Chart   -Double click the 4/17/25 Forms Completion telephone encounter  -Scroll down to the Media section   -Click the blue Hyperlink: Family Medical Leave Act, Bindu Kim, 4/25/25  -Click Acknowledge located in the top right ribbon/menu   -Drag the mouse into the blank space of the document and a + sign will appear. Left click to   electronically sign the document.  -Once signed, simply exit out of the screen and you signature will be saved.     Thank you,  Izzy MCMULLEN

## 2025-05-12 ENCOUNTER — ULTRASOUND ENCOUNTER (OUTPATIENT)
Dept: OBGYN CLINIC | Facility: CLINIC | Age: 30
End: 2025-05-12
Payer: COMMERCIAL

## 2025-05-12 DIAGNOSIS — Z36.89 ENCOUNTER FOR FETAL ANATOMIC SURVEY (HCC): ICD-10-CM

## 2025-05-13 ENCOUNTER — ROUTINE PRENATAL (OUTPATIENT)
Dept: OBGYN CLINIC | Facility: CLINIC | Age: 30
End: 2025-05-13

## 2025-05-13 VITALS
DIASTOLIC BLOOD PRESSURE: 65 MMHG | BODY MASS INDEX: 27.08 KG/M2 | SYSTOLIC BLOOD PRESSURE: 103 MMHG | HEIGHT: 64 IN | WEIGHT: 158.63 LBS | HEART RATE: 85 BPM

## 2025-05-13 DIAGNOSIS — Z34.02 ENCOUNTER FOR SUPERVISION OF NORMAL FIRST PREGNANCY IN SECOND TRIMESTER (HCC): Primary | ICD-10-CM

## 2025-05-13 DIAGNOSIS — Z3A.20 20 WEEKS GESTATION OF PREGNANCY (HCC): ICD-10-CM

## 2025-05-13 PROCEDURE — 3008F BODY MASS INDEX DOCD: CPT | Performed by: ADVANCED PRACTICE MIDWIFE

## 2025-05-13 PROCEDURE — 3074F SYST BP LT 130 MM HG: CPT | Performed by: ADVANCED PRACTICE MIDWIFE

## 2025-05-13 PROCEDURE — 3078F DIAST BP <80 MM HG: CPT | Performed by: ADVANCED PRACTICE MIDWIFE

## 2025-05-13 NOTE — PROGRESS NOTES
Sarina, , is at 20w2d, here for her return OB visit.  Currently, she is feeling well. Denies 2nd trimester danger signs. Feeling fetal movement. Had anatomy US yesterday.      Vital signs and weight reviewed  See flowsheets     Assessment/Plan:   Anatomy US results not available in chart yet, will keep a look out for the results to be entered.    Next visit: 4 weeks    Reviewed:   Prenatal visit schedule  2nd trimester precautions and expectations   labor precautions  Danger signs      I have spent 20 minutes total time on the day of the encounter, including: preparing to see the patient, ordering/reviewing labs, performing a medically appropriate exam, and providing care coordination. face to face counseling, chart review, orders and coordination of care    Pt verbalized understanding. All questions answered. No barriers to learning identified

## 2025-05-17 ENCOUNTER — PATIENT MESSAGE (OUTPATIENT)
Dept: OBGYN CLINIC | Facility: CLINIC | Age: 30
End: 2025-05-17

## 2025-05-19 NOTE — TELEPHONE ENCOUNTER
Please call  all normal  Single intrauterine gestation measuring 19w5d  Cardiac activity seen.  Fetal anatomy appears to be normal.  INGRIS appears normal.  Cervix appears closed with no previa seen.

## 2025-05-19 NOTE — TELEPHONE ENCOUNTER
Pt verified name and .     Informed pt of normal anatomy scan results. Pt verbalized understanding.

## (undated) NOTE — LETTER
Date: 10/3/2024    Patient Name: Sarina Barnard          To Whom it may concern:    This letter has been written at the patient's request. The above patient was seen at [facility] for treatment of a medical condition.    This patient should be excused from attending work from 10/1/24 through 10/3/24.    The patient may return to work when for at least 24 hours, she has been fever free without the use of fever-reducing medicine and her symptoms have improved overall.       Sincerely,    [facility]

## (undated) NOTE — LETTER
[facility] - MIDW[facility]  [address] [place] [id number]  PH: [phone]  FAX: [phone]      Sarina Barnard  11/13/1995    To whom it may concern,     our patient Sarina is under the care of our midwifery practice for prenatal care and has an estimated due date of 09/28/2025. Feel free to contact our office if we may be of further assistance. Thank you.     Sincerely,     Violeta Pathak

## (undated) NOTE — LETTER
WHERE IS YOUR PAIN NOW?  Robert the areas on your body where you feel the described sensations.  Use the appropriate symbol.  Robert the areas of radiation.  Include all affected areas.  Just to complete the picture, please draw in the face.     ACHE:  ^ ^ ^   NUMBNESS:  0000   PINS & NEEDLES:  = = = =                              ^ ^ ^                       0000              = = = =                                    ^ ^ ^                       0000            = = = =      BURNING:  XXXX   STABBING: ////                  XXXX                ////                         XXXX          ////     Please robert the line below indicating your degree of pain right now  with 0 being no pain 10 being the worst pain possible.                                         0             1             2              3             4              5              6              7             8             9             10         Patient Signature: